# Patient Record
Sex: FEMALE | Race: BLACK OR AFRICAN AMERICAN | Employment: UNEMPLOYED | URBAN - METROPOLITAN AREA
[De-identification: names, ages, dates, MRNs, and addresses within clinical notes are randomized per-mention and may not be internally consistent; named-entity substitution may affect disease eponyms.]

---

## 2020-08-18 ENCOUNTER — HOSPITAL ENCOUNTER (EMERGENCY)
Facility: CLINIC | Age: 55
Discharge: HOME | End: 2020-08-18
Attending: EMERGENCY MEDICINE | Admitting: EMERGENCY MEDICINE
Payer: OTHER MISCELLANEOUS

## 2020-08-18 VITALS
TEMPERATURE: 98 F | DIASTOLIC BLOOD PRESSURE: 101 MMHG | SYSTOLIC BLOOD PRESSURE: 149 MMHG | OXYGEN SATURATION: 100 % | WEIGHT: 127 LBS | HEIGHT: 66 IN | RESPIRATION RATE: 12 BRPM | BODY MASS INDEX: 20.41 KG/M2 | HEART RATE: 59 BPM

## 2020-08-18 DIAGNOSIS — M54.6 PAIN IN THORACIC SPINE: ICD-10-CM

## 2020-08-18 DIAGNOSIS — M54.12 CERVICAL RADICULOPATHY: ICD-10-CM

## 2020-08-18 LAB
ANION GAP SERPL CALCULATED.3IONS-SCNC: 3 MMOL/L (ref 3–14)
BUN SERPL-MCNC: 14 MG/DL (ref 7–30)
CALCIUM SERPL-MCNC: 9.7 MG/DL (ref 8.5–10.1)
CHLORIDE SERPL-SCNC: 100 MMOL/L (ref 94–109)
CO2 SERPL-SCNC: 30 MMOL/L (ref 20–32)
CREAT SERPL-MCNC: 0.88 MG/DL (ref 0.52–1.04)
GFR SERPL CREATININE-BSD FRML MDRD: 74 ML/MIN/{1.73_M2}
GLUCOSE SERPL-MCNC: 118 MG/DL (ref 70–99)
POTASSIUM SERPL-SCNC: 3.6 MMOL/L (ref 3.4–5.3)
SODIUM SERPL-SCNC: 132 MMOL/L (ref 133–144)

## 2020-08-18 PROCEDURE — 99283 EMERGENCY DEPT VISIT LOW MDM: CPT | Performed by: EMERGENCY MEDICINE

## 2020-08-18 PROCEDURE — 80048 BASIC METABOLIC PNL TOTAL CA: CPT | Performed by: EMERGENCY MEDICINE

## 2020-08-18 PROCEDURE — 25000132 ZZH RX MED GY IP 250 OP 250 PS 637: Performed by: EMERGENCY MEDICINE

## 2020-08-18 PROCEDURE — 99284 EMERGENCY DEPT VISIT MOD MDM: CPT | Mod: Z6 | Performed by: EMERGENCY MEDICINE

## 2020-08-18 RX ORDER — TRAMADOL HYDROCHLORIDE 50 MG/1
50 TABLET ORAL EVERY 6 HOURS PRN
COMMUNITY
End: 2020-08-18

## 2020-08-18 RX ORDER — TRAMADOL HYDROCHLORIDE 50 MG/1
50 TABLET ORAL ONCE
Status: COMPLETED | OUTPATIENT
Start: 2020-08-18 | End: 2020-08-18

## 2020-08-18 RX ORDER — GABAPENTIN 100 MG/1
300 CAPSULE ORAL 3 TIMES DAILY
Qty: 90 CAPSULE | Refills: 0 | Status: SHIPPED | OUTPATIENT
Start: 2020-08-18 | End: 2020-09-06

## 2020-08-18 RX ORDER — TRAMADOL HYDROCHLORIDE 50 MG/1
50 TABLET ORAL EVERY 6 HOURS PRN
Qty: 15 TABLET | Refills: 0 | Status: SHIPPED | OUTPATIENT
Start: 2020-08-18 | End: 2024-05-06

## 2020-08-18 RX ORDER — PANTOPRAZOLE SODIUM 40 MG/1
40 TABLET, DELAYED RELEASE ORAL DAILY
Qty: 30 TABLET | Refills: 0 | Status: SHIPPED | OUTPATIENT
Start: 2020-08-18 | End: 2020-09-17

## 2020-08-18 RX ORDER — GABAPENTIN 100 MG/1
100 CAPSULE ORAL 3 TIMES DAILY
COMMUNITY
End: 2020-08-18

## 2020-08-18 RX ORDER — OXYCODONE AND ACETAMINOPHEN 5; 325 MG/1; MG/1
1 TABLET ORAL EVERY 6 HOURS PRN
COMMUNITY
End: 2024-05-03

## 2020-08-18 RX ADMIN — TRAMADOL HYDROCHLORIDE 50 MG: 50 TABLET, FILM COATED ORAL at 12:11

## 2020-08-18 ASSESSMENT — ENCOUNTER SYMPTOMS
VOMITING: 0
COUGH: 0
CHILLS: 0
SHORTNESS OF BREATH: 0
NAUSEA: 0
WEAKNESS: 1
FEVER: 0

## 2020-08-18 ASSESSMENT — MIFFLIN-ST. JEOR: SCORE: 1192.82
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
## 2020-08-18 NOTE — ED AVS SNAPSHOT
[facility], [place], Emergency Department  [address] [place] [id number]  Phone:  [phone]                                    Alejandrina Clement   MRN: [id number]    Department:  [facility], Emergency Department   Date of Visit:  8/18/2020           After Visit Summary Signature Page    I have received my discharge instructions, and my questions have been answered. I have discussed any challenges I see with this plan with the nurse or doctor.    ..........................................................................................................................................  Patient/Patient Representative Signature      ..........................................................................................................................................  Patient Representative Print Name and Relationship to Patient    ..................................................               ................................................  Date                                   Time    ..........................................................................................................................................  Reviewed by Signature/Title    ...................................................              ..............................................  Date                                               Time          22EPIC Rev 08/18

## 2020-08-18 NOTE — LETTER
August 18, 2020      To Whom It May Concern:      Alejandrina Clement was seen in our Emergency Department today, 08/18/20.  She is having arm pain and should not use/lift with her right arm until seen/cleared by neurosurgery.     Sincerely,        Missael Johnson MD
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
## 2020-08-23 ENCOUNTER — HOSPITAL ENCOUNTER (EMERGENCY)
Facility: CLINIC | Age: 55
Discharge: LEFT BEFORE DISCHARGE | End: 2020-08-23
Payer: COMMERCIAL

## 2020-08-23 VITALS
WEIGHT: 127 LBS | DIASTOLIC BLOOD PRESSURE: 66 MMHG | OXYGEN SATURATION: 98 % | TEMPERATURE: 98.8 F | HEART RATE: 89 BPM | BODY MASS INDEX: 20.41 KG/M2 | RESPIRATION RATE: 18 BRPM | HEIGHT: 66 IN | SYSTOLIC BLOOD PRESSURE: 98 MMHG

## 2020-08-23 ASSESSMENT — MIFFLIN-ST. JEOR: SCORE: 1192.82
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
## 2020-08-24 NOTE — ED NOTES
Dr Kiki Vallecillo reviewed EKGs dated 7-19-17 and 7-5-16. Pt ok for surgery. Pt stated she is feeling better and no longer needs to be seen by a provider. RN reviewed Declination of Medical Screening Examination form with patient. Form signed by patient and RN. Pt left ED at 2131.
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
## 2020-09-06 ENCOUNTER — HOSPITAL ENCOUNTER (EMERGENCY)
Facility: CLINIC | Age: 55
Discharge: HOME | End: 2020-09-06
Attending: EMERGENCY MEDICINE | Admitting: EMERGENCY MEDICINE
Payer: COMMERCIAL

## 2020-09-06 VITALS
OXYGEN SATURATION: 100 % | TEMPERATURE: 98.6 F | DIASTOLIC BLOOD PRESSURE: 88 MMHG | HEART RATE: 80 BPM | SYSTOLIC BLOOD PRESSURE: 105 MMHG

## 2020-09-06 DIAGNOSIS — Z76.0 ENCOUNTER FOR MEDICATION REFILL: ICD-10-CM

## 2020-09-06 PROCEDURE — 99282 EMERGENCY DEPT VISIT SF MDM: CPT | Performed by: EMERGENCY MEDICINE

## 2020-09-06 PROCEDURE — 99283 EMERGENCY DEPT VISIT LOW MDM: CPT | Mod: Z6 | Performed by: EMERGENCY MEDICINE

## 2020-09-06 RX ORDER — IBUPROFEN 200 MG
400 TABLET ORAL EVERY 6 HOURS PRN
Qty: 60 TABLET | Refills: 0 | Status: SHIPPED | OUTPATIENT
Start: 2020-09-06 | End: 2024-05-06

## 2020-09-06 RX ORDER — ACETAMINOPHEN 325 MG/1
650 TABLET ORAL EVERY 8 HOURS PRN
Qty: 30 TABLET | Refills: 0 | Status: SHIPPED | OUTPATIENT
Start: 2020-09-06 | End: 2024-05-06

## 2020-09-06 RX ORDER — DIPHENHYDRAMINE HCL 25 MG
25 TABLET ORAL EVERY 8 HOURS PRN
Qty: 12 TABLET | Refills: 0 | Status: SHIPPED | OUTPATIENT
Start: 2020-09-06 | End: 2024-05-06

## 2020-09-06 RX ORDER — GABAPENTIN 100 MG/1
300 CAPSULE ORAL 3 TIMES DAILY
Qty: 63 CAPSULE | Refills: 0 | Status: SHIPPED | OUTPATIENT
Start: 2020-09-06 | End: 2024-05-02

## 2020-09-06 ASSESSMENT — ENCOUNTER SYMPTOMS
CONFUSION: 0
ARTHRALGIAS: 1
NECK STIFFNESS: 0
FEVER: 0
EYE REDNESS: 0
BACK PAIN: 1
DIFFICULTY URINATING: 0
SHORTNESS OF BREATH: 0
HEADACHES: 0
COLOR CHANGE: 0
ABDOMINAL PAIN: 0

## 2020-09-06 NOTE — ED AVS SNAPSHOT
[facility], [place], Emergency Department  [address] [place] [id number]  Phone:  [phone]                                    Alejandrina Clement   MRN: [id number]    Department:  [facility], Emergency Department   Date of Visit:  9/6/2020           After Visit Summary Signature Page    I have received my discharge instructions, and my questions have been answered. I have discussed any challenges I see with this plan with the nurse or doctor.    ..........................................................................................................................................  Patient/Patient Representative Signature      ..........................................................................................................................................  Patient Representative Print Name and Relationship to Patient    ..................................................               ................................................  Date                                   Time    ..........................................................................................................................................  Reviewed by Signature/Title    ...................................................              ..............................................  Date                                               Time          22EPIC Rev 08/18

## 2020-09-07 NOTE — DISCHARGE INSTRUCTIONS
TODAY'S VISIT:  - Please call the Primary Care team to discuss and arrange a follow-up appointment as soon as possible.   - Return to the nearest Emergency Department with any new or worsening symptoms or any concerns.    FOLLOW-UP:  Please make an appointment to follow up with: A Primary Care Provider, as soon as possible.   - If you do not have a primary care provider, you can be seen in follow-up and establish care with one of our providers by calling of the the clinics below:  --- [facility] (phone: [phone])  --- Primary Care / [facility] (phone: [phone])   --- Primary Care - [facility] (phone: [phone])   - Have your provider review the results from today's visit with you again to make sure no further follow-up or additional testing is needed based on those results.     PRESCRIPTIONS / MEDICATIONS:  - Take medications only as recommended on packaging/prescriptions.  - Establish care with a Primary Care provider for ongoing medication refills.     OTHER INSTRUCTIONS:  - Do your best to stay hydrated.    RETURN TO THE EMERGENCY DEPARTMENT  Return to the Emergency Department immediately for any new or worsening symptoms or any concerns.     Remember that you can always come back to the Emergency Department if you are not able to see your regular doctor in the amount of time listed above, if you get any new symptoms, or if there is anything that worries you.
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
## 2020-09-07 NOTE — ED TRIAGE NOTES
Patient states she has run out of all of her medications including pain medications and needs refills.

## 2020-09-08 ASSESSMENT — ENCOUNTER SYMPTOMS
WEAKNESS: 0
WOUND: 0
NUMBNESS: 0
NECK PAIN: 1
SEIZURES: 0

## 2022-11-18 DIAGNOSIS — M79.643 PAIN, HAND: Primary | ICD-10-CM

## 2022-11-21 NOTE — TELEPHONE ENCOUNTER
Action November 21, 2022 1:51 PM MT   Action Taken Called patient to confirm which hand she will be coming in to be seen for. Patient confirms RT hand. Sent a request for images from  [phone].     DIAGNOSIS: right hand arthritis   APPOINTMENT DATE: 11/28/2022   NOTES STATUS DETAILS   OFFICE NOTE from referring provider SELF    OFFICE NOTE from other specialist Care Everywhere 06/24/2021 - Farida Santos MD -  Internal Med    06/22/2021 -  Physical Therapy    08/28/2020 - Ozzy Rm MD [facility]    More in [facility]..   DISCHARGE REPORT from the ER Care Everywhere 08/05/2020 -  [facility]   MEDICATION LIST Internal  Care Everywhere    EMG (for Spine) Care Everywhere 08/13/2020 - [facility] EMG   LABS     CBC/DIFF Care Everywhere 05/16/2022   MRI PACS HP:  08/12/2020 - RT Shoulder

## 2022-11-28 ENCOUNTER — PRE VISIT (OUTPATIENT)
Dept: ORTHOPEDICS | Facility: CLINIC | Age: 57
End: 2022-11-28

## 2022-12-13 ENCOUNTER — ANCILLARY PROCEDURE (OUTPATIENT)
Dept: GENERAL RADIOLOGY | Facility: CLINIC | Age: 57
End: 2022-12-13
Attending: STUDENT IN AN ORGANIZED HEALTH CARE EDUCATION/TRAINING PROGRAM
Payer: COMMERCIAL

## 2022-12-13 ENCOUNTER — OFFICE VISIT (OUTPATIENT)
Dept: ORTHOPEDICS | Facility: CLINIC | Age: 57
End: 2022-12-13
Payer: COMMERCIAL

## 2022-12-13 VITALS
SYSTOLIC BLOOD PRESSURE: 112 MMHG | BODY MASS INDEX: 19.83 KG/M2 | HEIGHT: 66 IN | DIASTOLIC BLOOD PRESSURE: 76 MMHG | WEIGHT: 123.4 LBS

## 2022-12-13 DIAGNOSIS — M79.645 THUMB PAIN, LEFT: ICD-10-CM

## 2022-12-13 DIAGNOSIS — G56.01 RIGHT CARPAL TUNNEL SYNDROME: ICD-10-CM

## 2022-12-13 DIAGNOSIS — M65.312 TRIGGER THUMB OF LEFT HAND: ICD-10-CM

## 2022-12-13 DIAGNOSIS — M79.645 THUMB PAIN, LEFT: Primary | ICD-10-CM

## 2022-12-13 DIAGNOSIS — G56.03 BILATERAL CARPAL TUNNEL SYNDROME: ICD-10-CM

## 2022-12-13 PROCEDURE — 73130 X-RAY EXAM OF HAND: CPT | Mod: TC | Performed by: RADIOLOGY

## 2022-12-13 PROCEDURE — 99204 OFFICE O/P NEW MOD 45 MIN: CPT | Mod: 25 | Performed by: STUDENT IN AN ORGANIZED HEALTH CARE EDUCATION/TRAINING PROGRAM

## 2022-12-13 PROCEDURE — 20550 NJX 1 TENDON SHEATH/LIGAMENT: CPT | Mod: FA | Performed by: STUDENT IN AN ORGANIZED HEALTH CARE EDUCATION/TRAINING PROGRAM

## 2022-12-13 RX ORDER — LIDOCAINE HYDROCHLORIDE 10 MG/ML
1 INJECTION, SOLUTION INFILTRATION; PERINEURAL
Status: DISCONTINUED | OUTPATIENT
Start: 2022-12-13 | End: 2024-05-03

## 2022-12-13 RX ORDER — TESTOSTERONE CYPIONATE 200 MG/ML
1 INJECTION INTRAMUSCULAR
Status: DISCONTINUED | OUTPATIENT
Start: 2022-12-13 | End: 2024-05-03

## 2022-12-13 RX ADMIN — LIDOCAINE HYDROCHLORIDE 1 ML: 10 INJECTION, SOLUTION INFILTRATION; PERINEURAL at 16:45

## 2022-12-13 RX ADMIN — TESTOSTERONE CYPIONATE 1 ML: 200 INJECTION INTRAMUSCULAR at 16:45

## 2022-12-13 ASSESSMENT — PAIN SCALES - GENERAL: PAINLEVEL: SEVERE PAIN (7)

## 2022-12-13 NOTE — PROGRESS NOTES
Hand / Upper Extremity Injection/Arthrocentesis: L thumb A1    Date/Time: 12/13/2022 4:45 PM  Performed by: Charles Weathers MD  Authorized by: Charles Weathers MD     Needle Size:  25 G  Guidance: landmark    Condition: trigger finger    Location:  Thumb    Site:  L thumb A1  Medications:  1 mL dexamethasone 120 MG/30ML; 1 mL lidocaine 1 %  Outcome:  Tolerated well, no immediate complications  Consent Given by:  Patient  Timeout: timeout called immediately prior to procedure    Prep: patient was prepped and draped in usual sterile fashion

## 2022-12-13 NOTE — LETTER
December 13, 2022      Alejandrina Clement  [address] [place] [address]        To Whom It May Concern:    Alejandrina Clement was seen in our clinic. She was unable to work from 11/14/22- 12/13/22 due to medical reasons. 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       Sincerely,        Charles Weathers MD

## 2022-12-13 NOTE — LETTER
December 13, 2022      Alejandrina Clement  [address] [place] [address]        To Whom It May Concern:    Alejandrina Clement was seen in our clinic. She was unable to work from 11/14/22- 12/14/22 due to medical reasons. 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       Sincerely,        Charles Weathers MD

## 2022-12-13 NOTE — PATIENT INSTRUCTIONS
Thank you for choosing [facility]    Dr. Weathers Locations:    [facility] [address], [address]  [address] [address]   Appointments: [phone] Appointments: [phone]   Fax: [phone] Fax: [phone]     Follow up: after completion of EMG    An EMG referral was placed today at [facility]. You can call [phone] to schedule at your convenience.     Please call [phone] to schedule your follow up appointment.

## 2022-12-13 NOTE — PROGRESS NOTES
Orthopaedic Surgery Hand and Upper Extremity Clinic H&P NOTE:  Date: Dec 13, 2022    Patient Name: Alejandrina Clement  MRN: [id number]    CHIEF COMPLAINT: left thumb pain, right hand numbness    Dominant Hand:  Right  Occupation: opener at [employer]     HPI:  Ms. Alejandrina Clement is a 57 year old female right hand dominant who presents with left thumb pain and right hand numbness and  weakness. The right hand numbness and tingling has been present a few years, but worsening recently. Patient has a history of cervical radiculopathy and was previously evaluated by [facility] neurosurgery in 2021. Notes in care everywhere were reviewed. At the time she had an EMG/NCS which neurosurgery notes say demonstrated mild right carpal tunnel syndrome, EMG is not available for review. States all fingers go numb and painful at night, wakes her up. Has a brace but has not been wearing consistently recently. She also has complaints of left thumb pain, ongoing 2 weeks with no known injury. She has difficulty bending left thumb in mornings. Very tender at A1 pulley.      PAST MEDICAL HISTORY:  Past Medical History:   Diagnosis Date     Benign tumor of brain (H)      Hypertension        PAST SURGICAL HISTORY:  No past surgical history on file.    MEDICATIONS:  Current Outpatient Medications   Medication     acetaminophen (TYLENOL) 325 MG tablet     oxyCODONE-acetaminophen (PERCOCET) 5-325 MG tablet     diphenhydrAMINE (BENADRYL) 25 MG tablet     gabapentin (NEURONTIN) 100 MG capsule     ibuprofen (ADVIL/MOTRIN) 200 MG tablet     oxyCODONE (ROXICODONE) 5 MG immediate release tablet     traMADol (ULTRAM) 50 MG tablet     No current facility-administered medications for this visit.       ALLERGIES:     Allergies   Allergen Reactions     Vicodin [Hydrocodone-Acetaminophen]        FAMILY HISTORY:  No pertinent family history    SOCIAL HISTORY:  Social History     Tobacco Use     Smoking status: Every Day     Packs/day: 0.50     Types:  "Cigarettes     Smokeless tobacco: Never   Substance Use Topics     Alcohol use: Yes     Alcohol/week: 5.0 standard drinks     Types: 5 Shots of liquor per week     Drug use: Yes     Frequency: 3.0 times per week     Types: Marijuana     The patient's past medical, family, and social history was reviewed and confirmed.    REVIEW OF SYMPTOMS:      General: Negative   Eyes: Negative   Ear, Nose and Throat: Negative   Respiratory: Negative   Cardiovascular: Negative   Gastrointestinal: Negative   Genito-urinary: Negative   Musculoskeletal: Negative  Neurological: Negative   Psychological: Negative  HEME: Negative   ENDO: Negative   SKIN: Negative    VITALS:  Vitals:    12/13/22 1541   Weight: 56 kg (123 lb 6.4 oz)   Height: 1.676 m (5' 6\")       EXAM:  General: NAD, A&Ox3  HEENT: NC/AT  CV: RRR by peripheral pulse  Pulmonary: Non-labored breathing on RA  RUE:  Skin intact, mild thenar atrophy  Mild CMC tenderness  Negative Tinel's at carpal tunnel, negative Durkan's compression test at carpal tunnel  Negative Tinel's at cubital tunnel  \"scratchy/hypersensitive\" at index finger tip (median), intact ulnar and radial  WWP CR< 2s    LUE:  Skin intact, mild thenar atrophy  +TTP and swelling and palpable nodule at A1 pulley of left thumb  Diminished flexion ability  Mild CMC tenderness  Negative Tinel's at carpal tunnel, negative Durkan's compression test at carpal tunnel  Negative Tinel's at cubital tunnel  Intact EPl/FPL/HI  SILT m/r/u  WWP CR< 2s        IMAGING:    Bilateral hand XR today demonstrates Mild thumb CMC subluxation without degneerative change on the right. Otherwise no abnormalities    MRI c-spine 5/2020 (care everywhere)  IMPRESSION:   1.  Advanced spondylosis with severe disc degeneration C4-C7. Advanced facet arthropathy C7-T1 with mild anterior spondylolisthesis and asymmetric right-sided facet edema at C7-T1 and T1-T2.   2.  Multilevel severe foraminal stenoses right C4-C5, bilateral C5-C6, bilateral " C6-C7, and bilateral C7-T1. Moderate to severe left foraminal stenosis at C4-C5.   3.  Mild to moderate spinal canal stenosis at C5-C6 and mild spinal canal stenosis at C6-C7.      I have personally reviewed the above images and labs.         IMPRESSION AND RECOMMENDATIONS:  Ms. Alejandrina Clement is a 57 year old female right hand dominant with right hand weakness, numbness/pain most consistent with right carpal tunnel syndrome. Also with left trigger thumb.    Recommend repeat EMG/NCS bilaterally to evaluate for carpal tunnel syndrome, cubital tunnel syndrome, cervical radiculopathy    For left trigger thumb, recommended a steroid injection, patient is agreeable.    After obtaining written consent, I cleansed the skin over the A1 pulley of the left thumb with chlorhexidine.  I then anesthetized skin with ethyl chloride free spray after which I injected 1 cc 1% lidocaine and 1 cc of dexamethasone 4 mg/mL into the A1 pulley region and flexor tendon sheath of the FPL.  The patient tolerated this well with an immediate improvement in her symptoms.    Patient was provided with a right carpal tunnel splint to be worn at night. Instructed patient to wear this every night.    Follow-up after EMG/NCS    Charles Weathers MD    Hand, Upper Extremity & Microvascular Surgery  Department of Orthopaedic Surgery  [facility]

## 2022-12-13 NOTE — LETTER
12/13/2022         RE: Alejandrina Clement  [address]  [place] [address]        Dear Colleague,    Thank you for referring your patient, Alejandrina Clement, to the [facility] [place]. Please see a copy of my visit note below.    Orthopaedic Surgery Hand and Upper Extremity Clinic H&P NOTE:  Date: Dec 13, 2022    Patient Name: Alejandrina Clement  MRN: [id number]    CHIEF COMPLAINT: left thumb pain, right hand numbness    Dominant Hand:  Right  Occupation: opener at [employer]     HPI:  Ms. Alejandrina Clement is a 57 year old female right hand dominant who presents with left thumb pain and right hand numbness and  weakness. The right hand numbness and tingling has been present a few years, but worsening recently. Patient has a history of cervical radiculopathy and was previously evaluated by [facility] neurosurgery in 2021. Notes in care everywhere were reviewed. At the time she had an EMG/NCS which neurosurgery notes say demonstrated mild right carpal tunnel syndrome, EMG is not available for review. States all fingers go numb and painful at night, wakes her up. Has a brace but has not been wearing consistently recently. She also has complaints of left thumb pain, ongoing 2 weeks with no known injury. She has difficulty bending left thumb in mornings. Very tender at A1 pulley.      PAST MEDICAL HISTORY:  Past Medical History:   Diagnosis Date     Benign tumor of brain (H)      Hypertension        PAST SURGICAL HISTORY:  No past surgical history on file.    MEDICATIONS:  Current Outpatient Medications   Medication     acetaminophen (TYLENOL) 325 MG tablet     oxyCODONE-acetaminophen (PERCOCET) 5-325 MG tablet     diphenhydrAMINE (BENADRYL) 25 MG tablet     gabapentin (NEURONTIN) 100 MG capsule     ibuprofen (ADVIL/MOTRIN) 200 MG tablet     oxyCODONE (ROXICODONE) 5 MG immediate release tablet     traMADol (ULTRAM) 50 MG tablet     No current facility-administered medications for this  "visit.       ALLERGIES:     Allergies   Allergen Reactions     Vicodin [Hydrocodone-Acetaminophen]        FAMILY HISTORY:  No pertinent family history    SOCIAL HISTORY:  Social History     Tobacco Use     Smoking status: Every Day     Packs/day: 0.50     Types: Cigarettes     Smokeless tobacco: Never   Substance Use Topics     Alcohol use: Yes     Alcohol/week: 5.0 standard drinks     Types: 5 Shots of liquor per week     Drug use: Yes     Frequency: 3.0 times per week     Types: Marijuana     The patient's past medical, family, and social history was reviewed and confirmed.    REVIEW OF SYMPTOMS:      General: Negative   Eyes: Negative   Ear, Nose and Throat: Negative   Respiratory: Negative   Cardiovascular: Negative   Gastrointestinal: Negative   Genito-urinary: Negative   Musculoskeletal: Negative  Neurological: Negative   Psychological: Negative  HEME: Negative   ENDO: Negative   SKIN: Negative    VITALS:  Vitals:    12/13/22 1541   Weight: 56 kg (123 lb 6.4 oz)   Height: 1.676 m (5' 6\")       EXAM:  General: NAD, A&Ox3  HEENT: NC/AT  CV: RRR by peripheral pulse  Pulmonary: Non-labored breathing on RA  RUE:  Skin intact, mild thenar atrophy  Mild CMC tenderness  Negative Tinel's at carpal tunnel, negative Durkan's compression test at carpal tunnel  Negative Tinel's at cubital tunnel  \"scratchy/hypersensitive\" at index finger tip (median), intact ulnar and radial  WWP CR< 2s    LUE:  Skin intact, mild thenar atrophy  +TTP and swelling and palpable nodule at A1 pulley of left thumb  Diminished flexion ability  Mild CMC tenderness  Negative Tinel's at carpal tunnel, negative Durkan's compression test at carpal tunnel  Negative Tinel's at cubital tunnel  Intact EPl/FPL/HI  SILT m/r/u  WWP CR< 2s        IMAGING:    Bilateral hand XR today demonstrates Mild thumb CMC subluxation without degneerative change on the right. Otherwise no abnormalities    MRI c-spine 5/2020 (care everywhere)  IMPRESSION:   1.  Advanced " spondylosis with severe disc degeneration C4-C7. Advanced facet arthropathy C7-T1 with mild anterior spondylolisthesis and asymmetric right-sided facet edema at C7-T1 and T1-T2.   2.  Multilevel severe foraminal stenoses right C4-C5, bilateral C5-C6, bilateral C6-C7, and bilateral C7-T1. Moderate to severe left foraminal stenosis at C4-C5.   3.  Mild to moderate spinal canal stenosis at C5-C6 and mild spinal canal stenosis at C6-C7.      I have personally reviewed the above images and labs.         IMPRESSION AND RECOMMENDATIONS:  Ms. Alejandrina Clement is a 57 year old female right hand dominant with right hand weakness, numbness/pain most consistent with right carpal tunnel syndrome. Also with left trigger thumb.    Recommend repeat EMG/NCS bilaterally to evaluate for carpal tunnel syndrome, cubital tunnel syndrome, cervical radiculopathy    For left trigger thumb, recommended a steroid injection, patient is agreeable.    After obtaining written consent, I cleansed the skin over the A1 pulley of the left thumb with chlorhexidine.  I then anesthetized skin with ethyl chloride free spray after which I injected 1 cc 1% lidocaine and 1 cc of dexamethasone 4 mg/mL into the A1 pulley region and flexor tendon sheath of the FPL.  The patient tolerated this well with an immediate improvement in her symptoms.    Patient was provided with a right carpal tunnel splint to be worn at night. Instructed patient to wear this every night.    Follow-up after EMG/NCS    Charles Weathers MD    Hand, Upper Extremity & Microvascular Surgery  Department of Orthopaedic Surgery  [facility]      Hand / Upper Extremity Injection/Arthrocentesis: L thumb A1    Date/Time: 12/13/2022 4:45 PM  Performed by: Charles Weathers MD  Authorized by: Charles Weathers MD     Needle Size:  25 G  Guidance: landmark    Condition: trigger finger    Location:  Thumb    Site:  L thumb A1  Medications:  1 mL dexamethasone 120 MG/30ML; 1 mL  lidocaine 1 %  Outcome:  Tolerated well, no immediate complications  Consent Given by:  Patient  Timeout: timeout called immediately prior to procedure    Prep: patient was prepped and draped in usual sterile fashion              Again, thank you for allowing me to participate in the care of your patient.        Sincerely,        Charles Weathers MD

## 2024-05-02 ENCOUNTER — APPOINTMENT (OUTPATIENT)
Dept: CT IMAGING | Facility: CLINIC | Age: 59
End: 2024-05-02
Attending: EMERGENCY MEDICINE
Payer: COMMERCIAL

## 2024-05-02 ENCOUNTER — HOSPITAL ENCOUNTER (OUTPATIENT)
Facility: CLINIC | Age: 59
Setting detail: OBSERVATION
Discharge: NURSING, REHAB OR LONG-TERM CARE | End: 2024-05-03
Attending: EMERGENCY MEDICINE | Admitting: EMERGENCY MEDICINE
Payer: COMMERCIAL

## 2024-05-02 DIAGNOSIS — R51.9 NONINTRACTABLE HEADACHE, UNSPECIFIED CHRONICITY PATTERN, UNSPECIFIED HEADACHE TYPE: Primary | ICD-10-CM

## 2024-05-02 DIAGNOSIS — D49.7 PITUITARY TUMOR: ICD-10-CM

## 2024-05-02 DIAGNOSIS — F14.90 COCAINE USE: ICD-10-CM

## 2024-05-02 DIAGNOSIS — F10.90 ALCOHOL USE DISORDER: ICD-10-CM

## 2024-05-02 PROBLEM — F43.23 ADJUSTMENT DISORDER WITH MIXED ANXIETY AND DEPRESSED MOOD: Status: ACTIVE | Noted: 2024-05-02

## 2024-05-02 PROBLEM — F10.10 NONDEPENDENT ALCOHOL ABUSE, EPISODIC DRINKING BEHAVIOR: Status: ACTIVE | Noted: 2024-05-02

## 2024-05-02 PROBLEM — F14.10 NONDEPENDENT COCAINE ABUSE, EPISODIC (H): Status: ACTIVE | Noted: 2024-05-02

## 2024-05-02 LAB
ALBUMIN SERPL BCG-MCNC: 4.3 G/DL (ref 3.5–5.2)
ALP SERPL-CCNC: 68 U/L (ref 40–150)
ALT SERPL W P-5'-P-CCNC: 38 U/L (ref 0–50)
AMPHETAMINES UR QL SCN: ABNORMAL
ANION GAP SERPL CALCULATED.3IONS-SCNC: 11 MMOL/L (ref 7–15)
AST SERPL W P-5'-P-CCNC: 56 U/L (ref 0–45)
BARBITURATES UR QL SCN: ABNORMAL
BASOPHILS # BLD AUTO: 0 10E3/UL (ref 0–0.2)
BASOPHILS NFR BLD AUTO: 1 %
BENZODIAZ UR QL SCN: ABNORMAL
BILIRUB SERPL-MCNC: 0.6 MG/DL
BUN SERPL-MCNC: 13.3 MG/DL (ref 6–20)
BZE UR QL SCN: ABNORMAL
CALCIUM SERPL-MCNC: 9.2 MG/DL (ref 8.6–10)
CANNABINOIDS UR QL SCN: ABNORMAL
CHLORIDE SERPL-SCNC: 102 MMOL/L (ref 98–107)
CREAT SERPL-MCNC: 0.94 MG/DL (ref 0.51–0.95)
DEPRECATED HCO3 PLAS-SCNC: 22 MMOL/L (ref 22–29)
EGFRCR SERPLBLD CKD-EPI 2021: 70 ML/MIN/1.73M2
EOSINOPHIL # BLD AUTO: 0.1 10E3/UL (ref 0–0.7)
EOSINOPHIL NFR BLD AUTO: 1 %
ERYTHROCYTE [DISTWIDTH] IN BLOOD BY AUTOMATED COUNT: 14.6 % (ref 10–15)
FENTANYL UR QL: ABNORMAL
GLUCOSE SERPL-MCNC: 75 MG/DL (ref 70–99)
HCT VFR BLD AUTO: 38.2 % (ref 35–47)
HGB BLD-MCNC: 12.6 G/DL (ref 11.7–15.7)
HOLD SPECIMEN: NORMAL
IMM GRANULOCYTES # BLD: 0 10E3/UL
IMM GRANULOCYTES NFR BLD: 0 %
LYMPHOCYTES # BLD AUTO: 4.9 10E3/UL (ref 0.8–5.3)
LYMPHOCYTES NFR BLD AUTO: 76 %
MAGNESIUM SERPL-MCNC: 2.1 MG/DL (ref 1.7–2.3)
MCH RBC QN AUTO: 28.6 PG (ref 26.5–33)
MCHC RBC AUTO-ENTMCNC: 33 G/DL (ref 31.5–36.5)
MCV RBC AUTO: 87 FL (ref 78–100)
MONOCYTES # BLD AUTO: 0.5 10E3/UL (ref 0–1.3)
MONOCYTES NFR BLD AUTO: 7 %
NEUTROPHILS # BLD AUTO: 1 10E3/UL (ref 1.6–8.3)
NEUTROPHILS NFR BLD AUTO: 15 %
NRBC # BLD AUTO: 0 10E3/UL
NRBC BLD AUTO-RTO: 0 /100
OPIATES UR QL SCN: ABNORMAL
PCP QUAL URINE (ROCHE): ABNORMAL
PLATELET # BLD AUTO: 256 10E3/UL (ref 150–450)
POTASSIUM SERPL-SCNC: 4.4 MMOL/L (ref 3.4–5.3)
PROT SERPL-MCNC: 7.8 G/DL (ref 6.4–8.3)
RBC # BLD AUTO: 4.4 10E6/UL (ref 3.8–5.2)
SODIUM SERPL-SCNC: 135 MMOL/L (ref 135–145)
TSH SERPL DL<=0.005 MIU/L-ACNC: 1.61 UIU/ML (ref 0.3–4.2)
WBC # BLD AUTO: 6.5 10E3/UL (ref 4–11)

## 2024-05-02 PROCEDURE — 84443 ASSAY THYROID STIM HORMONE: CPT | Performed by: EMERGENCY MEDICINE

## 2024-05-02 PROCEDURE — 36415 COLL VENOUS BLD VENIPUNCTURE: CPT | Performed by: EMERGENCY MEDICINE

## 2024-05-02 PROCEDURE — 70450 CT HEAD/BRAIN W/O DYE: CPT | Mod: 26 | Performed by: RADIOLOGY

## 2024-05-02 PROCEDURE — 70450 CT HEAD/BRAIN W/O DYE: CPT

## 2024-05-02 PROCEDURE — 96360 HYDRATION IV INFUSION INIT: CPT | Mod: 59

## 2024-05-02 PROCEDURE — 258N000003 HC RX IP 258 OP 636: Performed by: EMERGENCY MEDICINE

## 2024-05-02 PROCEDURE — 99285 EMERGENCY DEPT VISIT HI MDM: CPT | Mod: 25 | Performed by: EMERGENCY MEDICINE

## 2024-05-02 PROCEDURE — 250N000013 HC RX MED GY IP 250 OP 250 PS 637: Performed by: EMERGENCY MEDICINE

## 2024-05-02 PROCEDURE — 99285 EMERGENCY DEPT VISIT HI MDM: CPT | Performed by: EMERGENCY MEDICINE

## 2024-05-02 PROCEDURE — 84075 ASSAY ALKALINE PHOSPHATASE: CPT | Performed by: EMERGENCY MEDICINE

## 2024-05-02 PROCEDURE — 83735 ASSAY OF MAGNESIUM: CPT | Performed by: EMERGENCY MEDICINE

## 2024-05-02 PROCEDURE — G0378 HOSPITAL OBSERVATION PER HR: HCPCS

## 2024-05-02 PROCEDURE — 80307 DRUG TEST PRSMV CHEM ANLYZR: CPT | Performed by: EMERGENCY MEDICINE

## 2024-05-02 PROCEDURE — 85025 COMPLETE CBC W/AUTO DIFF WBC: CPT | Performed by: EMERGENCY MEDICINE

## 2024-05-02 RX ORDER — NICOTINE 21 MG/24HR
1 PATCH, TRANSDERMAL 24 HOURS TRANSDERMAL DAILY
Status: DISCONTINUED | OUTPATIENT
Start: 2024-05-02 | End: 2024-05-03 | Stop reason: HOSPADM

## 2024-05-02 RX ADMIN — SODIUM CHLORIDE, POTASSIUM CHLORIDE, SODIUM LACTATE AND CALCIUM CHLORIDE 1000 ML: 600; 310; 30; 20 INJECTION, SOLUTION INTRAVENOUS at 10:46

## 2024-05-02 RX ADMIN — NICOTINE 1 PATCH: 14 PATCH, EXTENDED RELEASE TRANSDERMAL at 16:00

## 2024-05-02 ASSESSMENT — ACTIVITIES OF DAILY LIVING (ADL)
ADLS_ACUITY_SCORE: 35

## 2024-05-02 ASSESSMENT — COLUMBIA-SUICIDE SEVERITY RATING SCALE - C-SSRS
2. HAVE YOU ACTUALLY HAD ANY THOUGHTS OF KILLING YOURSELF IN THE PAST MONTH?: YES
5. HAVE YOU STARTED TO WORK OUT OR WORKED OUT THE DETAILS OF HOW TO KILL YOURSELF? DO YOU INTEND TO CARRY OUT THIS PLAN?: NO
6. HAVE YOU EVER DONE ANYTHING, STARTED TO DO ANYTHING, OR PREPARED TO DO ANYTHING TO END YOUR LIFE?: NO
1. IN THE PAST MONTH, HAVE YOU WISHED YOU WERE DEAD OR WISHED YOU COULD GO TO SLEEP AND NOT WAKE UP?: YES
4. HAVE YOU HAD THESE THOUGHTS AND HAD SOME INTENTION OF ACTING ON THEM?: NO
3. HAVE YOU BEEN THINKING ABOUT HOW YOU MIGHT KILL YOURSELF?: NO

## 2024-05-02 NOTE — ED PROVIDER NOTES
"    [facility] EMERGENCY DEPARTMENT ([facility])    5/02/24       ED PROVIDER NOTE    History     Chief Complaint   Patient presents with    Drug / Alcohol Assessment     HPI  Alejandrina Clement is a 59 year old female who reports having a distant history of pituitary tumor and a distant history of alcohol and drug abuse has been sober for 8 years. Reports relapsing using consistent alcohol and nasal and smoking cocaine over the past 3 days. Patient was directed here for medical evaluation and help upon full assessment. Patient reports feeling suicidal while intoxicated last night. She did not have a plan or drink attempt, any actions currently while clinically sober. Patient denies suicidality or any other acute mental health symptoms. Full history also reveals that patient felt like she has had intermittent visual symptoms which are unclear the specifics and intermittent headaches though does not have a headache or visual symptoms right now.    Physical Exam   BP: 111/80  Pulse: 67  Temp: 97.4  F (36.3  C)  Resp: 18  Height: 167.6 cm (5' 6\")  Weight: 58.5 kg (129 lb)  SpO2: 92 %  Physical Exam  Patient overall well-appearing, nontoxic, dry skin.  Pupils equal reactive to light.  Lungs clear.  Heart regular rhythm rate and rhythm.  Skin dry.  Denies SI/HI, hallucinations or delusions.    ED Course, Procedures, & Data      Procedures              Results for orders placed or performed during the hospital encounter of 05/02/24   CT Head w/o Contrast     Status: None    Narrative    CT HEAD W/O CONTRAST 5/2/2024 10:38 AM    History: pit tumor history, intermittent visual symptoms/headache?     Comparison: none available     Technique: Using multidetector thin collimation helical acquisition  technique, axial, coronal and sagittal CT images from the skull base  to the vertex were obtained without intravenous contrast.   (topogram) image(s) also obtained and reviewed.    Findings:     There is no intracranial " hemorrhage, mass effect, or midline shift.  Gray/white matter differentiation in both cerebral hemispheres is  preserved. Ventricles are proportionate to the cerebral sulci. The  basal cisterns are clear.    The bony calvaria and the bones of the skull base are normal. The  visualized portions of the paranasal sinuses and mastoid air cells are  unremarkable. The pituitary gland does not appear abnormally enlarged.      Impression    Impression:  No acute intracranial pathology. Consider MRI as  clinically indicated.    I have personally reviewed the examination and initial interpretation  and I agree with the findings.    ANGELICA GRIFFITHS MD         SYSTEM ID:  [id number]   Comprehensive metabolic panel     Status: Abnormal   Result Value Ref Range    Sodium 135 135 - 145 mmol/L    Potassium 4.4 3.4 - 5.3 mmol/L    Carbon Dioxide (CO2) 22 22 - 29 mmol/L    Anion Gap 11 7 - 15 mmol/L    Urea Nitrogen 13.3 6.0 - 20.0 mg/dL    Creatinine 0.94 0.51 - 0.95 mg/dL    GFR Estimate 70 >60 mL/min/1.73m2    Calcium 9.2 8.6 - 10.0 mg/dL    Chloride 102 98 - 107 mmol/L    Glucose 75 70 - 99 mg/dL    Alkaline Phosphatase 68 40 - 150 U/L    AST 56 (H) 0 - 45 U/L    ALT 38 0 - 50 U/L    Protein Total 7.8 6.4 - 8.3 g/dL    Albumin 4.3 3.5 - 5.2 g/dL    Bilirubin Total 0.6 <=1.2 mg/dL   Magnesium     Status: Normal   Result Value Ref Range    Magnesium 2.1 1.7 - 2.3 mg/dL   TSH with free T4 reflex     Status: Normal   Result Value Ref Range    TSH 1.61 0.30 - 4.20 uIU/mL   CBC with platelets and differential     Status: Abnormal   Result Value Ref Range    WBC Count 6.5 4.0 - 11.0 10e3/uL    RBC Count 4.40 3.80 - 5.20 10e6/uL    Hemoglobin 12.6 11.7 - 15.7 g/dL    Hematocrit 38.2 35.0 - 47.0 %    MCV 87 78 - 100 fL    MCH 28.6 26.5 - 33.0 pg    MCHC 33.0 31.5 - 36.5 g/dL    RDW 14.6 10.0 - 15.0 %    Platelet Count 256 150 - 450 10e3/uL    % Neutrophils 15 %    % Lymphocytes 76 %    % Monocytes 7 %    % Eosinophils 1 %    % Basophils 1 %     % Immature Granulocytes 0 %    NRBCs per 100 WBC 0 <1 /100    Absolute Neutrophils 1.0 (L) 1.6 - 8.3 10e3/uL    Absolute Lymphocytes 4.9 0.8 - 5.3 10e3/uL    Absolute Monocytes 0.5 0.0 - 1.3 10e3/uL    Absolute Eosinophils 0.1 0.0 - 0.7 10e3/uL    Absolute Basophils 0.0 0.0 - 0.2 10e3/uL    Absolute Immature Granulocytes 0.0 <=0.4 10e3/uL    Absolute NRBCs 0.0 10e3/uL   Extra Tube     Status: None    Narrative    The following orders were created for panel order Extra Tube.  Procedure                               Abnormality         Status                     ---------                               -----------         ------                     Extra Red Top Tube[[id number]]                               Final result                 Please view results for these tests on the individual orders.   Extra Red Top Tube     Status: None   Result Value Ref Range    Hold Specimen [facility]    Urine Drug Screen Panel     Status: Abnormal   Result Value Ref Range    Amphetamines Urine Screen Negative Screen Negative    Barbituates Urine Screen Negative Screen Negative    Benzodiazepine Urine Screen Negative Screen Negative    Cannabinoids Urine Screen Negative Screen Negative    Cocaine Urine Screen Positive (A) Screen Negative    Fentanyl Qual Urine Screen Negative Screen Negative    Opiates Urine Screen Negative Screen Negative    PCP Urine Screen Negative Screen Negative   CBC with platelets differential     Status: Abnormal    Narrative    The following orders were created for panel order CBC with platelets differential.  Procedure                               Abnormality         Status                     ---------                               -----------         ------                     CBC with platelets and d...[[id number]]  Abnormal            Final result                 Please view results for these tests on the individual orders.   Urine Drug Screen     Status: Abnormal    Narrative    The following orders were  created for panel order Urine Drug Screen.  Procedure                               Abnormality         Status                     ---------                               -----------         ------                     Urine Drug Screen Panel[[id number]]      Abnormal            Final result                 Please view results for these tests on the individual orders.     Medications   nicotine (NICODERM CQ) 14 MG/24HR 24 hr patch 1 patch (has no administration in time range)   lactated ringers BOLUS 1,000 mL (0 mLs Intravenous Stopped 5/2/24 1154)     Labs Ordered and Resulted from Time of ED Arrival to Time of ED Departure   COMPREHENSIVE METABOLIC PANEL - Abnormal       Result Value    Sodium 135      Potassium 4.4      Carbon Dioxide (CO2) 22      Anion Gap 11      Urea Nitrogen 13.3      Creatinine 0.94      GFR Estimate 70      Calcium 9.2      Chloride 102      Glucose 75      Alkaline Phosphatase 68      AST 56 (*)     ALT 38      Protein Total 7.8      Albumin 4.3      Bilirubin Total 0.6     CBC WITH PLATELETS AND DIFFERENTIAL - Abnormal    WBC Count 6.5      RBC Count 4.40      Hemoglobin 12.6      Hematocrit 38.2      MCV 87      MCH 28.6      MCHC 33.0      RDW 14.6      Platelet Count 256      % Neutrophils 15      % Lymphocytes 76      % Monocytes 7      % Eosinophils 1      % Basophils 1      % Immature Granulocytes 0      NRBCs per 100 WBC 0      Absolute Neutrophils 1.0 (*)     Absolute Lymphocytes 4.9      Absolute Monocytes 0.5      Absolute Eosinophils 0.1      Absolute Basophils 0.0      Absolute Immature Granulocytes 0.0      Absolute NRBCs 0.0     URINE DRUG SCREEN PANEL - Abnormal    Amphetamines Urine Screen Negative      Barbituates Urine Screen Negative      Benzodiazepine Urine Screen Negative      Cannabinoids Urine Screen Negative      Cocaine Urine Screen Positive (*)     Fentanyl Qual Urine Screen Negative      Opiates Urine Screen Negative      PCP Urine Screen Negative     MAGNESIUM  - Normal    Magnesium 2.1     TSH WITH FREE T4 REFLEX - Normal    TSH 1.61       CT Head w/o Contrast   Final Result   Impression:  No acute intracranial pathology. Consider MRI as   clinically indicated.      I have personally reviewed the examination and initial interpretation   and I agree with the findings.      ANGELICA GRIFFITHS MD            SYSTEM ID:  [id number]             Critical care was not performed.     Medical Decision Making  The patient's presentation was of high complexity (a chronic illness severe exacerbation, progression, or side effect of treatment).    The patient's evaluation involved:  ordering and/or review of 3+ test(s) in this encounter (see separate area of note for details)  independent interpretation of testing performed by another health professional (CT head)  discussion of management or test interpretation with another health professional (DEC  for mental health)    The patient's management necessitated high risk (a decision regarding hospitalization).    Assessment & Plan    Given patient's reported pituitary tumor history and intermittent vague symptoms of headache and vision changes, will rule out large intracranial mass or intracranial bleed. This also may represent a Laxson waxing waning headache syndrome. CT head to evaluate second problem. relapse of drug and alcohol use x 3 days with suicidality last night. DEC assessment for drug and alcohol use as well as passive suicidality yesterday. Labs urine drug screen. Patient does not qualify for inpatient medical detox given shorter course of drug and alcohol use but will attempt assessment to perhaps get patient into lodging plus or other programs.    11:30 AM CT head negative -I independently reviewed the CT head showing no signs of large intracranial mass or bleed    pending DEC assessment and laboratory testing. Will provide disposition.    12 PM: I had discussion about hospitalization for mental health and chemical  dependency disorders with the DEC .  Ultimately we decided that patient does not have any acute needs for inpatient mental health admission but rather a crisis bed or a [facility] assessment for lodging plus or other residential alcohol and drug use disorder treatment.      2pm:   I updated the patient and provided nicotine patch for patient's tobacco use  Pending [facility] availability for potential disposition to residential treatment facility      I have reviewed the nursing notes. I have reviewed the findings, diagnosis, plan and need for follow up with the patient.    New Prescriptions    No medications on file       Final diagnoses:   Alcohol use disorder   Cocaine use   Pituitary tumor       Thomas Thayer MD  [facility] EMERGENCY DEPARTMENT  5/2/2024        Thomas Thayer MD  05/02/24 0712

## 2024-05-02 NOTE — CONSULTS
"Diagnostic Evaluation Consultation  Crisis Assessment    Patient Name: Alejandrina Clement  Age:  59 year old  Legal Sex: female  Gender Identity: female  Pronouns:   Race:    Black or   Black or   Ethnicity: Choose not to answer  Language: English      Patient was assessed: Virtual: [employer] Crisis Assessment Start Time: 1114 Crisis Assessment Stop Time: 1144  Patient location: [facility] EMERGENCY DEPARTMENT                             ED20    Referral Data and Chief Complaint  Alejandrina Clement presents to the ED via EMS. Patient is presenting to the ED for the following concerns: Substance use, Depression, Anxiety, Worsening psychosocial stress.   Factors that make the mental health crisis life threatening or complex are:  Pt arrived at the ED via EMS after relapsing on alcohol and cocaine after eight years of sobriety. Pt has binged on alcohol and cocaine over the past three days and her last use of both was yesterday evening. Pt recently lost her home and had to move her mother into a nursing home. Pt is homeless and has been staying in a motel and reports that it is \"drug infested and filled with addicts.\" Pt has no formal mental health diagnoses and/or treatment history and has never been prescribed psych meds. Pt has a hx of polysubstance abuse in the past and was last in CD treatment when she was in her 30's. Pt has limited resources and psychosocial support. Pt reported that she started to have SI thoughts when she was heavily intoxicated yesterday evening and woke up this morning realizing that she needed to go to get help at the hospital instead of drinking the bottle of henessy that was next to her. Pt denied having an SI plan or intent when experiencing SI yesterday, but thought she might drink herself to death if she continued to use drugs and alcohol. Pt reported that she has dealt with anxiety and depression symptoms for a long time, but has been able to manage " them on her own without any treatment. Pt reported of having one previous SI attempt a couple of years ago and started to overdose on percocet, but stopped herself from taking too many and did not require hospitalization. Pt denied having any SI thoughts today and expressed desire to get treatment. Pt expressed concerns that she would go back to using drugs and alcohol if she discharged back to the motel where she was staying before. Pt expressed having a lot of guilt and remorse about relapsing and wanted to move forward with getting treatment for her mental health and sobriety..      Informed Consent and Assessment Methods  Explained the crisis assessment process, including applicable information disclosures and limits to confidentiality, assessed understanding of the process, and obtained consent to proceed with the assessment.  Assessment methods included conducting a formal interview with patient, review of medical records, collaboration with medical staff, and obtaining relevant collateral information from family and community providers when available.  : done     Patient response to interventions: eager to participate, acceptance expressed, verbalizes understanding  Coping skills were attempted to reduce the crisis:  help seeking, future oriented     History of the Crisis   Pt previously had a long history of heavy use of drugs and alcohol when she was in her 20's and 30's. Pt has been sober for the past eight years and recently has been experiencing multiple psychosocial and environmental stressors since losing her home. Pt endorses symptoms of having difficulties sleeping, poor appetite/recent weight loss, increased depression and anxiety, passive SI yesterday. Pt reported that she was unsure if she was experiencing auditory hallucinations or if it was the voices of others in the motel that she heard crying out for help yesterday evening when she was intoxicated.    Brief Psychosocial History  Family:   Single, Children no  Support System:  Limited to, Other (specify) (Niece and Nephew)  Employment Status:  employed part-time, other (see comments) (works at [employer], currently on probation, has interview at [employer] on Monday)  Source of Income:  public assistance, salary/wages (food stamps)  Financial Environmental Concerns:  unable to afford rent/mortgage  Current Hobbies:  games, other (see comments) (playing games on phone)  Barriers in Personal Life:  mental health concerns    Significant Clinical History  Current Anxiety Symptoms:  racing thoughts, excessive worry, anxious  Current Depression/Trauma:  difficulty concentrating, crying or feels like crying, irritable, sadness, excessive guilt, thoughts of death/suicide  Current Somatic Symptoms:  anxious, excessive worry  Current Psychosis/Thought Disturbance:  impulsive, auditory hallucinations  Current Eating Symptoms:  loss of appetite, recent weight loss  Chemical Use History:  Alcohol: Other (comments), Binge (heavy binge drinking over the past three days since relapsing after 8 years of sobriety. Consumed three bottles of renita with a friend yesterday)  Last Use:: 05/01/24  Benzodiazepines: None  Opiates: None  Cocaine: Other (comments), Snorted (heavy use of cocaine over past 3 days after 8 years of sobriety.)  Last Use:: 05/01/24  Marijuana: None  Other Use: None   Past diagnosis:  Substance Use Disorder  Family history:  No known history of mental health or chemical health concerns  Past treatment:  AA/NA, Other (Rehab/[facility] treatment in 20's/30's)  Details of most recent treatment:  Rehab/[facility] treatment in 20's/30's, no mental health treatment hx     Risk Assessment  [place] Suicide Severity Rating Scale Full Clinical Version:  Suicidal Ideation  Q1 Wish to be Dead (Lifetime): Yes  Q2 Non-Specific Active Suicidal Thoughts (Lifetime): Yes  3. Active Suicidal Ideation with any Methods (Not Plan) Without Intent to Act (Lifetime): Yes  Q4 Active  Suicidal Ideation with Some Intent to Act, Without Specific Plan (Lifetime): Yes  Q5 Active Suicidal Ideation with Specific Plan and Intent (Lifetime): No  Q6 Suicide Behavior (Lifetime): no     Suicidal Behavior (Lifetime)  Actual Attempt (Lifetime): Yes (attempted overdose of percocet a couple of years ago)  Total Number of Actual Attempts (Lifetime): 1  Has subject engaged in non-suicidal self-injurious behavior? (Lifetime): No  Interrupted Attempts (Lifetime): No  Aborted or Self-Interrupted Attempt (Lifetime): No  Preparatory Acts or Behavior (Lifetime): No    [place] Suicide Severity Rating Scale Recent:   Suicidal Ideation (Recent)  Q1 Wished to be Dead (Past Month): yes  Q2 Suicidal Thoughts (Past Month): yes  Q3 Suicidal Thought Method: yes  Q4 Suicidal Intent without Specific Plan: yes  Q5 Suicide Intent with Specific Plan: no  Level of Risk per Screen: high risk  Intensity of Ideation (Recent)  Most Severe Ideation Rating (Past 1 Month): 3  Frequency (Past 1 Month): Less than once a week  Duration (Past 1 Month): Less than 1 hour/some of the time  Controllability (Past 1 Month): Can control thoughts with little difficulty  Deterrents (Past 1 Month): Deterrents probably stopped you  Reasons for Ideation (Past 1 Month): Mostly to end or stop the pain (You couldn't go on living with the pain or how you were feeling)  Suicidal Behavior (Recent)  Actual Attempt (Past 3 Months): No  Total Number of Actual Attempts (Past 3 Months): 0  Has subject engaged in non-suicidal self-injurious behavior? (Past 3 Months): No  Interrupted Attempts (Past 3 Months): No  Total Number of Interrupted Attempts (Past 3 Months): 0  Aborted or Self-Interrupted Attempt (Past 3 Months): No  Total Number of Aborted or Self-Interrupted Attempts (Past 3 Months): 0  Preparatory Acts or Behavior (Past 3 Months): No  Total Number of Preparatory Acts (Past 3 Months): 0    Environmental or Psychosocial Events: challenging interpersonal  relationships, unstable housing, homelessness, other life stressors, impulsivity/recklessness, ongoing abuse of substances  Protective Factors: Protective Factors: help seeking, optimistic outlook - identification of future goals    Does the patient have thoughts of harming others? Feels Like Hurting Others: no  Previous Attempt to Hurt Others: no  Is the patient engaging in sexually inappropriate behavior?: no    Is the patient engaging in sexually inappropriate behavior?  no        Mental Status Exam   Affect: Appropriate (tearful at times)  Appearance: Appropriate  Attention Span/Concentration: Attentive  Eye Contact: Engaged    Fund of Knowledge: Appropriate   Language /Speech Content: Fluent  Language /Speech Volume: Normal  Language /Speech Rate/Productions: Normal  Recent Memory: Intact  Remote Memory: Intact  Mood: Anxious  Orientation to Person: Yes   Orientation to Place: Yes  Orientation to Time of Day: Yes  Orientation to Date: Yes     Situation (Do they understand why they are here?): Yes  Psychomotor Behavior: Normal  Thought Content: Clear  Thought Form: Intact, Goal Directed     Mini-Cog Assessment  Number of Words Recalled:    Clock-Drawing Test:     Three Item Recall:    Mini-Cog Total Score:       Medication  Psychotropic medications:   Medication Orders - Psychiatric (From admission, onward)      None             Current Care Team  Patient Care Team:  No Ref-Primary, Physician as PCP - Charles Dimas MD as Assigned Musculoskeletal Provider    Diagnosis  Patient Active Problem List   Diagnosis Code    Trichomoniasis A59.9    Upper Back Pain (Between Shoulder Blades) M54.6    Chest Pain R07.9    Adjustment disorder with mixed anxiety and depressed mood F43.23    Nondependent alcohol abuse, episodic drinking behavior F10.10    Nondependent cocaine abuse, episodic (H) F14.10       Primary Problem This Admission  Active Hospital Problems    *Adjustment disorder with mixed anxiety and depressed  mood      Nondependent alcohol abuse, episodic drinking behavior      Nondependent cocaine abuse, episodic (H)    Primary Diagnosis This Admission: F43.23    Clinical Summary and Substantiation of Recommendations   After therapeutic assessment, intervention and aftercare planning by ED care team and [facility] and in consultation with attending provider, the patient's circumstances and mental state were appropriate for transitioning to [facility] at  and [facility] treatment. After this is set up, the pt is recommended to follow up with a primary care provider for establishment and wellness check up and psychiatric provider for a medication evaluation.   A this time the pt is not presenting as an acute risk to self or others due to the following factors: pt does not endorse current SI or SI plan, is help seeking and future oriented and is wanting to stay sober and get [facility] treatment after relapsing on alcohol and cocaine three days ago after 8 years of sobriety. Pt has multiple psychosocial stressors and limited support and resources and has been staying in a motel with several drug addicts after recently losing her home. Hence, [facility] is recommended for further stabilization in an environment that fosters sobriety.                      Patient coping skills attempted to reduce the crisis:  help seeking, future oriented    Disposition  Recommended disposition:  ([facility] Referral)        Reviewed case and recommendations with attending provider. Attending Name: Thomas Thayer MD       Attending concurs with disposition: yes       Patient and/or validated legal guardian concurs with disposition:   yes       Final disposition:  Pt will either transfer to a crisis bed if available or stay overnight in the ED with follow up from  to assist with getting into [facility] program or further evaluate other alternatives.     Legal status on admission:      Assessment Details   Total duration spent with the  patient: 30 min     CPT code(s) utilized: 25091 - Psychotherapy for Crisis - 60 (30-74*) min    Enid Baer MA, CHRIS, LUZMA, Psychotherapist  [facility] - Triage & Transition Services  Callback: [phone]

## 2024-05-02 NOTE — CONSULTS
Met with pt for CD Consult and completed [facility] Comprehensive Assessment recommending residential LOC.  Pt is requesting residential [facility], faxing referrals to [facility] ([facility]) and [facility] per pt's verbal consent, faxing ROIs to ED for pt signature.  It is this writer's opinion that pt would benefit from [facility] admission if possible.    Recommendations:  1)  Complete a Residential CD Treatment Program  2)  Abstain from all mood-altering chemicals unless prescribed by a licensed provider.   3)  Attend, at minimum, 2 weekly support group meetings, such as 12 step based (AA/NA), SMART Recovery, Health Realizations, and/or Refuge Recovery meetings.     4)  Actively work with a mentor/sponsor on a weekly basis.   5)  Follow all the recommendations of your treatment/medical providers.  6)  Patient may benefit from obtaining a full mental health evaluation.    Clinical Substantiation:  Patient has unstable housing, lacks a sober living environment, would benefit from developing long-term sober maintenance skills, would benefit from developing sober coping skills, would benefit from developing a sober peer support network, and has mental health symptoms which are exacerbated by substance abuse.    Referrals/ Alternatives:  [facility]  [address] [place] [address]  P: [phone]  Office: [phone]  F: [phone]    [facility]  [address]  [facility] [address]  P: [phone]  F: [phone]    DAANES Assessment ID: [id number]     [facility] consult completed by:   RAYNE Pa, [facility]  Substance Use Disorder Evaluation Counselor  E-mail Address: [email or web address] Consult & Liaison Department  [facility], [address] [place] [address]     *Due to regulation of Title 42 of the Code of Federal Regulations (CFR) Part 2: Confidentiality laws  apply to this note and the information wherein.  Thus, this note cannot be copy and pasted into any other health care staff's note nor can it be included in general medical records sent to ANY outside agency without the patient's written consent.

## 2024-05-02 NOTE — ED TRIAGE NOTES
Arrives ambulatory with a need for drug/alcohol assessment. Per patient she is interested in going to a rehab. Per patient last use of cocaine and alcohol 3 days ago.     Triage Assessment (Adult)       Row Name 05/02/24 0944          Triage Assessment    Airway WDL WDL        Respiratory WDL    Respiratory WDL WDL        Skin Circulation/Temperature WDL    Skin Circulation/Temperature WDL WDL        Cardiac WDL    Cardiac WDL WDL        Peripheral/Neurovascular WDL    Peripheral Neurovascular WDL WDL        Cognitive/Neuro/Behavioral WDL    Cognitive/Neuro/Behavioral WDL WDL

## 2024-05-02 NOTE — ED NOTES
D: Alcohol use disorder  Cocaine use  Pituitary tumor    I: Monitored vitals and assessed pt status.   Changed: None  Running: None  PRN: None    A: A0x4. VSS, on RA, Sat>98%, Afebrile. Denies pain. Currently registered for observation, waiting for bed on [place] side for treatment    I/O this shift:  In: 2540 [P.O.:2540]  Out: -     Temp:  [97.4  F (36.3  C)-97.8  F (36.6  C)] 97.8  F (36.6  C)  Pulse:  [52-67] 52  Resp:  [18] 18  BP: (111-157)/(80-95) 157/95  SpO2:  [92 %-100 %] 100 %      P: Continue to monitor Pt status and report changes to treatment team.
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
## 2024-05-03 ENCOUNTER — BEH TREATMENT PLAN (OUTPATIENT)
Dept: BEHAVIORAL HEALTH | Facility: CLINIC | Age: 59
End: 2024-05-03
Attending: FAMILY MEDICINE

## 2024-05-03 ENCOUNTER — TELEPHONE (OUTPATIENT)
Dept: BEHAVIORAL HEALTH | Facility: CLINIC | Age: 59
End: 2024-05-03
Payer: COMMERCIAL

## 2024-05-03 ENCOUNTER — TELEPHONE (OUTPATIENT)
Dept: BEHAVIORAL HEALTH | Facility: CLINIC | Age: 59
End: 2024-05-03

## 2024-05-03 ENCOUNTER — HOSPITAL ENCOUNTER (OUTPATIENT)
Dept: BEHAVIORAL HEALTH | Facility: CLINIC | Age: 59
Discharge: HOME | End: 2024-05-03
Attending: FAMILY MEDICINE
Payer: COMMERCIAL

## 2024-05-03 VITALS
TEMPERATURE: 97.8 F | WEIGHT: 129 LBS | DIASTOLIC BLOOD PRESSURE: 99 MMHG | HEART RATE: 88 BPM | OXYGEN SATURATION: 100 % | RESPIRATION RATE: 14 BRPM | BODY MASS INDEX: 20.73 KG/M2 | HEIGHT: 66 IN | SYSTOLIC BLOOD PRESSURE: 120 MMHG

## 2024-05-03 DIAGNOSIS — F32.9 MDD (MAJOR DEPRESSIVE DISORDER): Primary | ICD-10-CM

## 2024-05-03 DIAGNOSIS — F19.90 SUBSTANCE USE DISORDER: Primary | ICD-10-CM

## 2024-05-03 PROCEDURE — G0378 HOSPITAL OBSERVATION PER HR: HCPCS

## 2024-05-03 PROCEDURE — H2035 A/D TX PROGRAM, PER HOUR: HCPCS

## 2024-05-03 PROCEDURE — 1002N00001 HC LODGING PLUS FACILITY CHARGE ADULT

## 2024-05-03 PROCEDURE — 99245 OFF/OP CONSLTJ NEW/EST HI 55: CPT | Performed by: PSYCHIATRY & NEUROLOGY

## 2024-05-03 PROCEDURE — 99238 HOSP IP/OBS DSCHRG MGMT 30/<: CPT | Performed by: FAMILY MEDICINE

## 2024-05-03 PROCEDURE — 250N000013 HC RX MED GY IP 250 OP 250 PS 637: Performed by: EMERGENCY MEDICINE

## 2024-05-03 RX ORDER — LORATADINE 10 MG/1
10 TABLET ORAL DAILY
COMMUNITY

## 2024-05-03 RX ORDER — AMOXICILLIN 250 MG
2 CAPSULE ORAL DAILY PRN
COMMUNITY

## 2024-05-03 RX ORDER — IBUPROFEN 200 MG
600 TABLET ORAL EVERY 6 HOURS PRN
COMMUNITY

## 2024-05-03 RX ORDER — MAGNESIUM HYDROXIDE/ALUMINUM HYDROXICE/SIMETHICONE 120; 1200; 1200 MG/30ML; MG/30ML; MG/30ML
30 SUSPENSION ORAL EVERY 6 HOURS PRN
COMMUNITY

## 2024-05-03 RX ORDER — GUAIFENESIN/DEXTROMETHORPHAN 100-10MG/5
10 SYRUP ORAL 4 TIMES DAILY PRN
COMMUNITY

## 2024-05-03 RX ORDER — MIRTAZAPINE 15 MG/1
15 TABLET, FILM COATED ORAL AT BEDTIME
Qty: 30 TABLET | Refills: 1 | Status: SHIPPED | OUTPATIENT
Start: 2024-05-03 | End: 2024-05-06

## 2024-05-03 RX ORDER — ACETAMINOPHEN 500 MG
500-1000 TABLET ORAL EVERY 8 HOURS PRN
COMMUNITY

## 2024-05-03 RX ADMIN — NICOTINE 1 PATCH: 14 PATCH, EXTENDED RELEASE TRANSDERMAL at 07:38

## 2024-05-03 ASSESSMENT — ACTIVITIES OF DAILY LIVING (ADL)
ADLS_ACUITY_SCORE: 35

## 2024-05-03 ASSESSMENT — ANXIETY QUESTIONNAIRES
4. TROUBLE RELAXING: NEARLY EVERY DAY
2. NOT BEING ABLE TO STOP OR CONTROL WORRYING: MORE THAN HALF THE DAYS
5. BEING SO RESTLESS THAT IT IS HARD TO SIT STILL: NOT AT ALL
1. FEELING NERVOUS, ANXIOUS, OR ON EDGE: NEARLY EVERY DAY
IF YOU CHECKED OFF ANY PROBLEMS ON THIS QUESTIONNAIRE, HOW DIFFICULT HAVE THESE PROBLEMS MADE IT FOR YOU TO DO YOUR WORK, TAKE CARE OF THINGS AT HOME, OR GET ALONG WITH OTHER PEOPLE: SOMEWHAT DIFFICULT
6. BECOMING EASILY ANNOYED OR IRRITABLE: SEVERAL DAYS
3. WORRYING TOO MUCH ABOUT DIFFERENT THINGS: NEARLY EVERY DAY
GAD7 TOTAL SCORE: 13
GAD7 TOTAL SCORE: 13
7. FEELING AFRAID AS IF SOMETHING AWFUL MIGHT HAPPEN: SEVERAL DAYS

## 2024-05-03 ASSESSMENT — COLUMBIA-SUICIDE SEVERITY RATING SCALE - C-SSRS
6. HAVE YOU EVER DONE ANYTHING, STARTED TO DO ANYTHING, OR PREPARED TO DO ANYTHING TO END YOUR LIFE?: NO
3. HAVE YOU BEEN THINKING ABOUT HOW YOU MIGHT KILL YOURSELF?: YES
6. HAVE YOU EVER DONE ANYTHING, STARTED TO DO ANYTHING, OR PREPARED TO DO ANYTHING TO END YOUR LIFE?: YES
1. SINCE LAST CONTACT, HAVE YOU WISHED YOU WERE DEAD OR WISHED YOU COULD GO TO SLEEP AND NOT WAKE UP?: NO
1. IN THE PAST MONTH, HAVE YOU WISHED YOU WERE DEAD OR WISHED YOU COULD GO TO SLEEP AND NOT WAKE UP?: YES
TOTAL  NUMBER OF INTERRUPTED ATTEMPTS SINCE LAST CONTACT: NO
TOTAL  NUMBER OF ABORTED OR SELF INTERRUPTED ATTEMPTS SINCE LAST CONTACT: NO
ATTEMPT SINCE LAST CONTACT: NO
SUICIDE, SINCE LAST CONTACT: NO
1. IN THE PAST MONTH, HAVE YOU WISHED YOU WERE DEAD OR WISHED YOU COULD GO TO SLEEP AND NOT WAKE UP?: YES
2. HAVE YOU ACTUALLY HAD ANY THOUGHTS OF KILLING YOURSELF LIFETIME?: YES
4. HAVE YOU HAD THESE THOUGHTS AND HAD SOME INTENTION OF ACTING ON THEM?: NO
2. HAVE YOU ACTUALLY HAD ANY THOUGHTS OF KILLING YOURSELF?: NO
5. HAVE YOU STARTED TO WORK OUT OR WORKED OUT THE DETAILS OF HOW TO KILL YOURSELF? DO YOU INTEND TO CARRY OUT THIS PLAN?: NO
2. HAVE YOU ACTUALLY HAD ANY THOUGHTS OF KILLING YOURSELF IN THE PAST MONTH?: YES

## 2024-05-03 ASSESSMENT — PATIENT HEALTH QUESTIONNAIRE - PHQ9: SUM OF ALL RESPONSES TO PHQ QUESTIONS 1-9: 15

## 2024-05-03 NOTE — PROGRESS NOTES
Progress Note    This patient had a IP Substance Use Disorder assessment on 5/2/2024 completed by CANDIE Pa.  This patient was seen for a face to face update of the IP Substance Use Disorder assessment on 5/3/2024 by CANDIE Pike.  INSIDE: The patient's IP Substance Use Disorder assessment completed on 5/2/2024 is in the patient's electronic medical record in Epic in the Chart Review section under the Notes/Trans Tab.    Alcohol/Drug use since the last CD evaluation (include date of last use):     No additional substances use since the last CD evaluation     Please note any other clinical changes since the last CD evaluation (such as medication changes, additional legal charges, detoxification admissions, overdoses, etc.)     No significant changes since the last CD evaluation       ASAM Dimensions Original scores Current Scores   I.) Intoxication and Withdrawal: 1 1   II.) Biomedical:  1 1   III.) Emotional and Behavioral:  2 2   IV.) Readiness to Change:  2 2   V.) Relapse Potential: 4 4   VI.) Recovery Environmental: 4 4     Please list clinical justifications for the above ASAM score changes since the original comprehensive assessment:     None of the ASAM scores on the six dimensions had changed since the IP Substance Use Disorder assessment was completed on 5/2/2024.       Current YRDER: Current UA:     0.000     Positive for Cocaine and Methamphetamine and negative for all other screened drugs.       PHQ-9, ESTEPHANIE-7   PHQ-9 on 5/3/2024 ESTEPHANIE-7 on 5/3/2024   The patient's PHQ-9 score was 15 out of 27, indicating moderately severe depression.   The patient's ESTEPHANIE-7 score was 13 out of 21, indicating moderate anxiety.       Glascock-Suicide Severity Rating Scale Reassessment   Have you ever wished you were dead or that you could go to sleep and not wake up?  Past Month:  Yes     Have you actually had any thoughts of killing yourself?  Past Month:  Yes     Have you been thinking about how you might do this?      Past Month:  Yes, Describe: Pt reports that she had thoughts of overdosing   Lifetime:  No   Have you had these thoughts and had some intention of acting on them?     Past Month:  No   Lifetime:  No   Have you started to work out the details of how to kill yourself?   Past Month:  No   Lifetime:  Yes, Describe: Pt reports she attempted to overdose in college   Do you intend to carry out this plan?   No     When you have the thoughts how long do they last?  Fleeting - few seconds or minutes     Are there things - anyone or anything (i.e. family, Orthodoxy, pain of death) that stopped you from wanting to die or acting on thoughts of suicide?  Uncertain that protective factors stopped you       2008  [employer] [facility]  Used with permission by Mirna Zambrano, PhD.       Guide to C-SSRS Risk Ratings   NO IDEATION:  with no active thoughts IDEATION: with a wish to die. IDEATION: with active thoughts. Risk Ratings   If Yes No No 0 - Very Low Risk   If NA Yes No 1 - Low Risk   If NA Yes Yes 2 - Low/moderate risk   IDEATION: associated thoughts of methods without intent or plan INTENT: Intent to follow through on suicide PLAN: Plan to follow through on suicide Risk Ratings cont...   If Yes No No 3 - Moderate Risk   If Yes Yes No 4 - High Risk   If Yes Yes Yes 5 - High Risk   The patient's ADDITIONAL RISK FACTORS and lack of PROTECTIVE FACTORS may increase their overall suicide risk ratings.     Additional Risk Factors:   Significant history of having untreated or poorly treated mental health symptoms    Significant history of physical illness or chronic medical problems    Significant history of untreated or poorly treated chronic pain issues    Tendency to be socially isolated and/or cut off from the support of others    A recent death of someone close to the patient and/or unresolved grief and loss issues    A recent loss that was significant to the patient, i.e. loss of job, loss of home,  divorce, break-up, etc.    Significant history of trauma and/or abuse issues    History of impulsive or aggressive behaviors    No additional risk factors   Protective Factors:   Having people in his/her life that would prevent the patient from considering a suicide attempt (i.e. young children, spouse, parents, etc.)    A positive relationship with his/her clinical medical and/or mental health providers    Having easy access to supportive family members    Having a good community support network    Having cultural, Advent or spiritual beliefs that discourage suicide    No significant protective factors     Risk Status   2. - Low/moderate risk: Document in Epic / SBAR to counselor     Additional information to support suicide risk rating: There was no additional information to provide at this time.

## 2024-05-03 NOTE — TELEPHONE ENCOUNTER
Writer reviewed assessment. Writer sent pt to [facility] for benefits verification, and added pt to waitlist. Writer discussed pt with nursing staff who verbally approved her to transfer to .

## 2024-05-03 NOTE — PROGRESS NOTES
[facility] Assessment      Vital signs:     There were no vitals taken for this visit.      Transfer from [place] ED    Counselor: Jessica  Drug of Choice: Alcohol, cocaine, Marijuana  Last use: 4/29/24  Home clinic/MD:   [address]  [phone]    Psychiatrist/therapist: none    Medical history/current conditions:  Alejandrina Clement is a 59 year old female who reports having a distant history of pituitary tumor and a distant history of alcohol and drug abuse has been sober for 8 years. Reports relapsing using consistent alcohol and nasal and smoking cocaine over the past 3 days.     H&P Screen:  H&P within the last 90 days: Yes.  Date: 5/3/24 Location: ED / [facility]      Mental Health diagnosis: Major Depressive Disorder, Single Episode, Moderate With anxious distress  R/O PTSD   Alcohol and cocaine use disorders     Pt saw Dr Busby while in ED and please see following recommendations:  I would try Remeron (mirtazepine) 15 mg HS. She was warned of morning grogginess for a few days after starting.   If the doesn't tolerate Remeron she could be switched to Lexapro 10 mg per day and trying Seroquel starting at 25 mg HS for sleep     Medication compliant?: yes  Recent sucidal thoughts? Pt states that with her last relapse she felt suicidal and tried to overdose with alcohol, cocaine and marijuana      When? [place] ED  Current thought of self-harm? no    Plan? na    Pain assessment:   Pt. Experiencing pain at this time?  Yes.  Rating on 0-10 scale: (1-10 scale): 6.  Location: lower back  Chronic  Result of: arthritis .   Pt is aware of OTC availability in Med room    LP Falls assessment    Has patient had a fall(s) in the last 6 months?  No  If yes, what were the circumstances surrounding the fall(s)? NA  Does patient have gait dysfunctions? (limping, dragging of toes, shuffling feet, unsteadiness, difficulty standing /walking)   No  Does patient appear to have deconditioning/muscle loss/malnutrition/fatigue? (due to inactivity, bedrest (long hospitalization), medical condition(s) No  Does patient experience confusion, dizziness or vertigo? No  Is patient visually impaired? No   Does patient have any adaptive equipment (hearing aids, wheelchair, walker, prosthesis, crutches, cane, etc..) No  Is patient taking 2 or more of the following medications?  anticonvulsants, anti-hypertensives, diuretics, laxatives, sedatives, and psychotropics (single-select) No  Is patient taking medication (s) that would cause urinary or bowel urgency (ex: lactulose/Furosemide)? No  Does patient have a medical condition (ex: Diabetes, Liver Disease, Respiratory Diseases, Chronic Pain, Heart Disease, Neuropathy, Seizure like Disorder, etc...) that could affect balance/gait or risk for falls? No    If yes to any of the above educate patient on fall prevention while at [facility].           Floors clutter/obstacle free           Proper footwear/[place] shoes          Encourage the use of appropriate lighting.            Adjust walking speed accordingly          Recommend caution going on longer walks. Try shorter walks and see how you do first.  Use elevators when appropriate.          Notify staff if you are experiencing fatigue, weakness, drowsiness/ dizziness or have had a fall.           Use adaptive equipment as recommended          Wheelchairs must be managed and propelled independently without staff assistance           Expectation of complete independence with all cares and compliance.  Report to staff if having difficulty     LP patient who poses a potential falls risk will be advised of the following:  Please follow the recommendations as listed above or it may affect your treatment plan.  Your treatment team would have to evaluate if this level of care is appropriate for you.    Patient acknowledges and verbalizes understanding of the above criteria?  Yes  Support staff / counselors notified of pt Fall Screen and plan of prevention. LPRN to re-assess as appropriate. White board and SBAR updated  low risk  ____________________________________________________________________________________________________________________________  RN Assessment of Infectious Disease concerns:    Infectious disease concerns None    ____________________________________________________________________________________________________________________________     Community Medical Screen for COVID-19    Do you have any of the following NEW or worsening symptoms NOT attributed to pre-existing conditions?    No    Fever of 100.0  F (37.8 C) or over  Chills  Cough  Shortness of Breath  Loss of taste or smell  Generalized body aches  Persistent headache  Sore throat (or trouble eating or drinking in young children?)  Nausea, Vomiting, or diarrhea (loose stools)    If pt responds YES to any of the symptoms / Positive COVID-19  without symptoms pt will need to leave unit immediately and can return in 6 days from discharge date.      Did you test positive for COVID-19 in the last 10 days or are you waiting on the test results due to an exposure or symptoms?  No    Has anyone told you to self-quarantine due to exposure to someone with COVID-19?  No    COVID - 9 positive patients must quarantine for five days.  They can return to in-person therapy on day 6 following Duration of Special Precautions for COVID-19.      COVID-19 Test completed by LPRN ? No    COVID-19 - Pt informed of the following while at LP:    1) If pt has any of the symptoms below, notify staff immediately.    Fever   Cough   Shortness of breath or difficulty breathing   Chills   Repeated shaking with chills  Muscle pain   ____________________________________________________________________________________________________________________________    RN Assessment of Patient's Ability to Safely Manage and Self-Administer  Respiratory Treatments    Has experience in the management of Respiratory (If NA, indicate and move to Integrative Therapies):  NA    Integrative Therapies: Essential Oils    Patient requesting essential oil inhaler to manage (Mood/Mental Health/Physical/Spiritual symptoms).     Discussed appropriate use of essential oil inhalers and instructed patient not to leave labeled product out on unit.     Patient was screened for kidney disease, asthma/reactive airway disease and rashes and wounds or 1st trimester of pregnancy    List Essential Oils requested by pt [place] LIMIT ONE ESSENTIAL OIL PER PT    Patient verbalized and demonstrated understanding of how to use essential oil inhaler correctly and will notify LP RN with any concerns or side effects. Patient agrees not to share their essential oil inhaler with other clients.  Continue to support the patient in safely utilizing integrative therapies as able to manage symptoms during treatment.     Patient tobacco use:    Do you use tobacco? yes   Type? cigarettes  How often? daily  How much? 7   Are you interested in quitting? yes    NRT (Nicotine Replacement therapy) ordered? yes   Pt is aware of the dangers of tobacco cessation and in contemplation.    Pt given written education.    Nutritional Assessment:    Have you ever purged, binged or restricted yourself as a way to control your weight?   No     Are you on a special diet?   No     Do you have any concerns regarding your nutritional status?   No     Have you had any appetite changes in the last 3 months?   No   Have you had weight loss or weight gain of more than 10 lbs in the last 3 months?   If patient gained or lost more than 10 lbs, then refer to program RN / attending Physician for assessment.   No   Was the patient informed of BMI?    Normal, No Intervention   Yes   Have you engaged in any risk-taking behavior that would put you at risk for exposure to blood-borne or sexually transmitted diseases?   No    Do you have any dental problems?   No. Pt has dentures       LPRN reviewed with pt the following information:  Yes  Pt informed if leaving AMA, they will be directed to take medications with them.  Should pt's choose to leave medications at LP, ALL medications will be packaged and delivered to inpatient pharmacy for temporary storage.  Inpt pharmacy will follow protocol to reach out to pt.  If pharmacy unable to reach pt and/or pt does not retrieve medications, they will be destroyed per inpt pharmacy protocol.   In regards to 'medical concerns/medication refill expectations' while at LP:  Pt understands LP has no rounding/managing provider to assess medical issues or to refill medications.  Pt's instructed to make virtual/phone appt/s with community provider/s and notify LPRN of date and time  Pt's understand they may not leave LP to attend any medical appt's.   Pt's understand they are responsible for having a plan to refill medications and to allow time to troubleshoot.      Pt verbalizes understanding of the above criteria yes    Nursing Assessment Summary:  As above    On-going nursing intervention required?   No    Acute care visit recommended: yes.    Referral for addiction medicine requested    [facility]  Nurse Liaison / CD Adult [facility]  O: [phone]  Fax:[phone]  LPRN Pool [id number]  M-F: 7AM to 5:30PM   Sat-Sun: 7AM to 3PM  After hours: [phone]

## 2024-05-03 NOTE — PROGRESS NOTES
Name: Alejandrina Clement  Date: 5/3/2024  Medical Record: [id number]    Envelope Number: [id number]    List of Contents (List each item separately in new row):   Cell phone and     Admission:  I am responsible for any personal items that are not sent to the safe or pharmacy.  [facility] is not responsible for loss, theft or damage of any property in my possession.      Patient Signature:  ___________________________________________       Date/Time:__________________________    Staff Signature: __________________________________       Date/Time:__________________________    [facility] Staff person, if patient is unable/unwilling to sign:      __________________________________________________________       Date/Time: __________________________      **All medications are packaged by LP staff and securely stored on [facility]. Medications left by patients at discharge will be packaged by LP staff and transported by LP staff to inpatient pharmacy for storage.**        Discharge:  [facility] has returned all of my personal belongings:    Patient Signature: ________________________________________     Date/Time: ____________________________________    Staff Signature: ______________________________________     Date/Time:_____________________________________

## 2024-05-03 NOTE — TELEPHONE ENCOUNTER
[facility] Psychiatry Clinic Appointment Request     Type of Request: Psychiatry / Addiction Medicine at: whoever can see her first she saw Psychiatrist Dr Busby in the ED and he asked that she have psychiatric follow up   [facility] Psychiatry, [facility], 2nd floor [address]  [address]  [address]    [phone]    [facility] nurse contact phone number: [phone]    Pt is currently attending [facility] residential treatment and requesting long term psychiatric/addiction medicine stabilization/service.     Diagnosis: Alcohol Use Disorder (AUD) and Cocaine Use Disorder    MH Diagnosis: PTSD, Recent SI, depression          Needs: NO    Requesting in-person appt.  Pt discharge date from [facility] is: 5/31    Clinician Gender Preference (if applicable): JENNI    [facility]  Nurse Liaison / CD Adult [facility] ()  [address] [place] [address]  O:[phone]  F:[phone]  [facility] [id number]

## 2024-05-03 NOTE — PROGRESS NOTES
Initial Services Plan    Before your first treatment group, please do the following    Immediate health & safety concerns: Look for sober housing and a supportive social network.  Look for a support network (such as AA, NA, DBT group, a Yazidism group, etc.)  Other: anxiety, obtain a DA in first 10 days    Suggestions for client during the time between intake & completion of treatment plan:  Tour your treatment center (unit or outpatient clinic).  Introduce yourself to the treatment group.  Spend time getting to know your peers.  Complete the problem list for your treatment plan.  Start drug and alcohol use history.  Review your patient or client handbook.    Client issues to be addressed in the first treatment sessions:  Identify motivations(s) for coming to treatment, i.e. legal, family, job, self  Identify outside concerns that may interfere with treatment, i.e. bills not getting paid, homesick for children    Maria Elena Harmon, [facility]  5/3/2024

## 2024-05-03 NOTE — PROGRESS NOTES
"Triage and Transition Services Extended Care Reassessment     Patient: Alejandrina goes by \"Alejandrina,\" uses she/her pronouns  Date of Service: May 3, 2024  Site of Service: [facility] EMERGENCY DEPARTMENT                             ED20  Patient was seen yes  Mode of Assessment: Virtual: [facility]     Reason for Reassessment: substance use, depression    History of Patient's Original Emergency Room Encounter: Pt previously had a long history of heavy use of drugs and alcohol when she was in her 20's and 30's. Pt has been sober for the past eight years and recently has been experiencing multiple psychosocial and environmental stressors since losing her home. Pt endorses symptoms of having difficulties sleeping, poor appetite/recent weight loss, increased depression and anxiety, passive SI yesterday. Pt reported that she was unsure if she was experiencing auditory hallucinations or if it was the voices of others in the motel that she heard crying out for help yesterday evening when she was intoxicated.    Current Patient Presentation: Pt is alert, oriented x4, calm, and cooperative. Affect is flat and eye contact appropriate. Mood is sad and depressed. She denies having SI or HI. Pt expresses an acceptance and willingness for [facility] treatment.    Presentation Summary: Pt is seen by extended care for therapeutic check-in and reassessment. Exchanged greeting, introduced self and role. Discussed with her about hopes and dreams, strengths and supports, and provided validation to thoughts and emotions. Pt is able to identify strenghts, protective factors, and able to identify goals for finding purpose and meaning in life.    Changes Observed Since Initial Assessment: decrease in presenting symptoms    Therapeutic Interventions Provided: Engaged in safety planning, Engaged in guided discovery, explored patient's perspectives and helped expand them through socratic dialogue., Engaged in cognitive restructuring/ reframing, " looked at common cognitive distortions and challenged negative thoughts.    Current Symptoms: anxious, excessive worry sadness, helplessness, hoplessness          Mental Status Exam   Affect: Appropriate  Appearance: Appropriate  Attention Span/Concentration: Attentive  Eye Contact: Engaged    Fund of Knowledge: Appropriate   Language /Speech Content: Fluent  Language /Speech Volume: Normal  Language /Speech Rate/Productions: Normal  Recent Memory: Intact  Remote Memory: Intact  Mood: Anxious  Orientation to Person: Yes   Orientation to Place: Yes  Orientation to Time of Day: Yes  Orientation to Date: Yes     Situation (Do they understand why they are here?): Yes  Psychomotor Behavior: Normal  Thought Content: Clear  Thought Form: Intact, Goal Directed    Treatment Objective(s) Addressed: rapport building, processing feelings, safety planning, assessing safety    Patient Response to Interventions: acceptance expressed, verbalizes understanding    Progress Towards Goals:  Patient Reports Symptoms Are: stable  Patient Progress Toward Goals: other  Comment: Pt is appropriate for outpatient services or [facility] treatment programming. She is motivated for change and seeking help.  Next Step to Work Toward Discharge: awaiting acceptance at community level of care  Awaiting Acceptance at [facility] Level of Care Comment: Referrals have been made to [facility], [facility], and to the [facility].    Case Management:      C-SSRS Since Last Contact:   1. Wish to be Dead (Since Last Contact): No  2. Non-Specific Active Suicidal Thoughts (Since Last Contact): No     Actual Attempt (Since Last Contact): No  Has subject engaged in non-suicidal self-injurious behavior? (Since Last Contact): No  Interrupted Attempts (Since Last Contact): No  Aborted or Self-Interrupted Attempt (Since Last Contact): No  Preparatory Acts or Behavior (Since Last Contact): No  Suicide (Since Last Contact): No     Calculated C-SSRS Risk Score (Since  Last Contact): No Risk Indicated    Plan: Final Disposition / Recommended Care Path: observation (Pt will either be transferring to a crisis bed if there is availability/accepted tonight, otherwise will stay overnight in the ED and be seen by EC team to assist in getting set up for [facility] during business hours and if they have availability.)  Plan for Care reviewed with assigned Medical Provider: yes  Plan for Care Team Review: provider  Comments: Pending results    Clinical Substantiation: After therapeutic assessment, intervention and aftercare planning by ED care team and LM and in consultation with attending provider, the patient's circumstances and mental state were appropriate for transitioning to [facility] program at  and [facility] treatment. After this is set up, the pt is recommended to follow up with a primary care provider for establishment and wellness check up and psychiatric provider for a medication evaluation.   A this time the pt is not presenting as an acute risk to self or others due to the following factors: pt does not endorse current SI or SI plan, is help seeking and future oriented and is wanting to stay sober and get [facility] treatment after relapsing on alcohol and cocaine three days ago after 8 years of sobriety. Pt has multiple psychosocial stressors and limited support and resources and has been staying in a motel with several drug addicts after recently losing her home. Hence, [facility] is recommended for further stabilization in an environment that fosters sobriety.    Legal Status: Legal Status at Admission: Voluntary/Patient has signed consent for treatment    Session Status: Time session started: 0948  Time session ended: 1006  Session Duration (minutes): 18 minutes  Session Number: 1  Anticipated number of sessions or this episode of care: 3    Session Start Time: 0948  Session Stop Time: 1006  CPT codes: 90378 - Psychotherapy (with patient) - 30 (16-37*) min  Time Spent: 18  minutes      CPT code(s) utilized: 64903 - Psychotherapy (with patient) - 30 (16-37*) min    Diagnosis:   Patient Active Problem List   Diagnosis Code    Trichomoniasis A59.9    Upper Back Pain (Between Shoulder Blades) M54.6    Chest Pain R07.9    Adjustment disorder with mixed anxiety and depressed mood F43.23    Nondependent alcohol abuse, episodic drinking behavior F10.10    Nondependent cocaine abuse, episodic (H) F14.10    Pituitary tumor D49.7    Cocaine use F14.90    Alcohol use disorder F10.90       Primary Problem This Admission: Active Hospital Problems    *Adjustment disorder with mixed anxiety and depressed mood      Nondependent alcohol abuse, episodic drinking behavior      Nondependent cocaine abuse, episodic (H)      Pituitary tumor      Cocaine use      Alcohol use disorder    F43.23 Adjustment disorder with mixed anxiety and depressed mood    Edgar Rider, [facility]   Licensed Mental Health Professional (LMHP), [facility]  [phone]

## 2024-05-03 NOTE — PROGRESS NOTES
Care Management Discharge Note    Discharge Date: 05/03/2024       Discharge Disposition:    [facility]  [address], [place],[address]    Discharge Services:  Transportation    Discharge DME:  None    Discharge Transportation:   [facility]     Private pay costs discussed: Not applicable    Does the patient's insurance plan have a 3 day qualifying hospital stay waiver?  No    PAS Confirmation Code:  N/A  Patient/family educated on Medicare website which has current facility and service quality ratings:  N/A    Education Provided on the Discharge Plan:  N/A  Persons Notified of Discharge Plans: RN  Patient/Family in Agreement with the Plan:  Yes    Handoff Referral Completed: Yes    Additional Information:  SW received call from ED Charge nurse. Pt will need a cab ride to get to [facility] before 3:00pm.     PANKAJ set up one way ride with [facility] [phone] for pt to get to [facility] [address], [place],[address].  _______________________    KALEY Hernandez, LSW  ED/OBS   [facility]  Phone: [phone]  Pager: [phone]  Fax: [phone]     On-call pager, [phone], 4:00pm to midnight     [place] [facility] Vocera     After hours Vocera [place] and After Hours Vocera [place]
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
## 2024-05-03 NOTE — ED PROVIDER NOTES
"ED Observation Discharge Summary  [facility]  Discharge Date: 5/3/2024    Alejandrina Clement MRN: [id number]   Age: 59 year old YOB: 1965     Brief HPI & Initial ED Course     Chief Complaint   Patient presents with    Drug / Alcohol Assessment     HPI  Alejandrina Clement is a 59 year old female with PMH notable for chemical dependency issues with alcohol and cocaine who presented to the ED with altered mental status. Initial history was limited due to altered mental status. The patient arrived with altered mental status with reason to suspect alcohol or other drug intoxication as etiology, and an exam that was without findings suggestive of trauma.     Upon being evaluated in the emergency department, the patient was found to have a condition that would benefit from ongoing monitoring. Observation care was initiated with plan for close clinical monitoring of the patient and her mental status for clearing of intoxicating substance, and broadening of the work-up if not clearing appropriately or if other indications develop.     See ED Observation H&P for further details on the patient's presenting history and initial evaluation.     Physical Exam   BP: 111/80  Pulse: 67  Temp: 97.4  F (36.3  C)  Resp: 18  Height: 167.6 cm (5' 6\")  Weight: 58.5 kg (129 lb)  SpO2: 92 %    Physical Exam  General: no acute distress. Alert.   HENT: MMM. Atraumatic   head.   Eyes: PERRL, normal sclerae   Neck: non-tender, supple  Cardio: Regular   rate. Regular rhythm.   Resp: Normal work of breathing, normal respiratory rate  Abdomen: no   tenderness, non-distended, no rebound, no guarding  Neuro: alert, fully oriented. Steady gait. CN II-XII grossly intact. Grossly normal strength and sensation.   Integumentary/Skin: no rash visualized, normal color    Results        Patient valuated here in the ER.  At this point we are looking for placement either crisis or to treatment center.    Through " Ortho Follow Up    Subjective:  Patient notes difficulty with therapy yesterday, but overall feels there are very small improvements. Says she will refuse surgery. Would like to get up to sitting in a chair today. No new ortho issues.    Objective:  Vitals:  Temp:  [97.6 °F (36.4 °C)-99.2 °F (37.3 °C)] 98.6 °F (37 °C)  Heart Rate:  [74-89] 88  Resp:  [16-18] 16  BP: (144-155)/(74-82) 154/78  Gen:  A&Ox3.  NAD.  Ext:   Compartments soft.  Motor function intact.  +SILT.   Extremities well-perfused.  No neuro changes.  Calves soft and symmetric.  No evidence of DVT.  Negative log roll, able to move toes and ankle without issue, 2+ PT pulse, foot well perfused, sensation intact    Labs:  HGB (g/dL)   Date Value   09/23/2022 12.0     HCT (%)   Date Value   09/23/2022 37.2     No results found for: INR    Assessment/Plan:  Nondisplaced fractures posterior wall and column right acetabulum. Doing well.  Plan for non-op treatment as long as patient can mobilize -  Patient told me this morning she would actually refuse surgery  · Pain controlled.  · DVT prophylaxis: Lovenox 30 mg BID IP  · Physical therapy/OT  · Operative extremity: FFTDWB with posterior hip precautions  · Disposition: Trauma primary. Will follow up with Dr. Morfin 2 weeks after injury    Zully Haley PA-C    Date: 9/25/2022 Time: 7:35 AM           "coordination with multiple caregivers able to arrange admission to [facility] the [facility].  Patient agrees with this plan otherwise been very stable reassessed by myself here in the ER.  At this point then was able to be discharged and transferred by cab over to [facility] close she will continue her current treatment plan for cocaine and alcohol relapse and abuse.    BP (!) 120/99   Pulse 88   Temp 97.8  F (36.6  C) (Oral)   Resp 14   Ht 1.676 m (5' 6\")   Wt 58.5 kg (129 lb)   SpO2 100%   BMI 20.82 kg/m    Examined and is unremarkable stable here in the ER      Procedures              Labs Ordered and Resulted from Time of ED Arrival to Time of ED Departure   COMPREHENSIVE METABOLIC PANEL - Abnormal       Result Value    Sodium 135      Potassium 4.4      Carbon Dioxide (CO2) 22      Anion Gap 11      Urea Nitrogen 13.3      Creatinine 0.94      GFR Estimate 70      Calcium 9.2      Chloride 102      Glucose 75      Alkaline Phosphatase 68      AST 56 (*)     ALT 38      Protein Total 7.8      Albumin 4.3      Bilirubin Total 0.6     CBC WITH PLATELETS AND DIFFERENTIAL - Abnormal    WBC Count 6.5      RBC Count 4.40      Hemoglobin 12.6      Hematocrit 38.2      MCV 87      MCH 28.6      MCHC 33.0      RDW 14.6      Platelet Count 256      % Neutrophils 15      % Lymphocytes 76      % Monocytes 7      % Eosinophils 1      % Basophils 1      % Immature Granulocytes 0      NRBCs per 100 WBC 0      Absolute Neutrophils 1.0 (*)     Absolute Lymphocytes 4.9      Absolute Monocytes 0.5      Absolute Eosinophils 0.1      Absolute Basophils 0.0      Absolute Immature Granulocytes 0.0      Absolute NRBCs 0.0     URINE DRUG SCREEN PANEL - Abnormal    Amphetamines Urine Screen Negative      Barbituates Urine Screen Negative      Benzodiazepine Urine Screen Negative      Cannabinoids Urine Screen Negative      Cocaine Urine Screen Positive (*)     Fentanyl Qual Urine Screen Negative      Opiates Urine Screen " Negative      PCP Urine Screen Negative     MAGNESIUM - Normal    Magnesium 2.1     TSH WITH FREE T4 REFLEX - Normal    TSH 1.61              Observation Course   The patient was admitted to observation status with plan for close clinical monitoring of the patient and her mental status for clearing of intoxicating substance, and broadening of the work-up if not clearing appropriately or if other indications develop.     Serial assessments of the patient's mental status were performed. Nursing notes were reviewed. During the observation period, the patient did not require medications for agitation, and did not require restraints/seclusion for patient and/or provider safety.         With monitoring, patient's mental status cleared. Patient then clinically sober with steady gait and tolerating PO. Normalization of mental status with sobering makes other causes of altered mental status very unlikely.     After counseling on the diagnosis, work-up, and treatment plan, the patient was discharged. Recommended safe cessation of EtOH/drugs and provided information on community treatment resources. Patient to follow-up with primary care in the coming days for recheck and further cessation counseling. Patient to return to the ED if any urgent or potentially life-threatening concerns.    Discharge Diagnoses:   Final diagnoses:   Alcohol use disorder   Cocaine use   Pituitary tumor       --  Ranjith Hubbard MD  [facility] EMERGENCY DEPARTMENT  5/3/2024      This note was created at least in part by the use of dragon voice dictation system. Inadvertent typographical errors may still exist.  Ranjith Hubbard MD.  Patient evaluated in the emergency department during the COVID-19 pandemic period. Careful attention to patients safety was addressed throughout the evaluation. Evaluation and treatment management was initiated with disposition made efficiently and appropriate as possible to minimize any risk of potential  exposure to patient during this evaluation.               Ranjith Hubbard MD  05/03/24 8100

## 2024-05-03 NOTE — PROGRESS NOTES
Name: Alejandrina Clement  Date: 5/3/2024  Medical Record: [id number]    Envelope Number: [id number]    List of Contents (List each item separately in new row):   Diclofenac sodium topical gel 1%    Admission:  I am responsible for any personal items that are not sent to the safe or pharmacy.  [facility] is not responsible for loss, theft or damage of any property in my possession.  Patient Signature:  ___________________________________________       Date/Time:__________________________    Staff Signature: __________________________________       Date/Time:__________________________    [facility] Staff person, if patient is unable/unwilling to sign:      __________________________________________________________       Date/Time: __________________________  **All medications are packaged by LP staff and securely stored on [facility]. Medications left by patients at discharge will be packaged by LP staff and transported by LP staff to inpatient pharmacy for storage.**  Discharge:  [facility] has returned all of my personal belongings:    Patient Signature: ________________________________________     Date/Time: ____________________________________    Staff Signature: ______________________________________     Date/Time:_____________________________________

## 2024-05-03 NOTE — DISCHARGE INSTRUCTIONS
"To go to [facility] now before 3pm by Cab that is being arranged to start chemical dependency treatment program.  Other resources below noted if needed.          Coordinators from [facility] ([facility]) will be calling you in the next 24-48 hours to ensure that you have the resources you need. For additonal need of mental health services, you can also contact [facility] coordinators directly at [phone].    Recommendations:    1) Follow up with [facility] or other [facility] treatment programs  2) Follow up with mental health therapy    If I am feeling unsafe or I am in a crisis, I will:  - Contact my established care providers   - Call the National Suicide Prevention Lifeline: 645.313.7786   - [place] CRISIS TEXT Line 913659  - Go to the nearest emergency room   - Call 831 or 887 or 211    Below is a list of FREE Mental Health Options in the [facility]:  [facility]  [phone]  Serves those in need of free outpatient mental health care  Hours: Mon, Wed, Fri 1-3pm; Mon-Thurs 6:30-8:30pm    [facility]  [address] [place] [id number]  [phone]    Peer Support  Call the CEE Helpline at  [phone]  Or text \"HelpLine\" to 01336    Additional Information  Today you were seen by a licensed mental health professional through Triage and Transition services, [facility] ([facility])  for a crisis assessment in the Emergency Department at [facility].  It is recommended that you follow up with your established providers (psychiatrist, mental health therapist, and/or primary care doctor - as relevant) as soon as possible.     Coordinators from [facility] will be calling you in the next 24-48 hours to ensure that you have the resources you need.  You can also contact [facility] coordinators directly at [phone]. You may have been scheduled for or offered an appointment with a mental health provider. [facility] maintains an extensive network of licensed " Patient advised dr. Weinberg will see her at 9 am next Thursday at the [facility].    behavioral health providers to connect patients with the services they need.  We do not charge providers a fee to participate in our referral network.  We match patients with providers based on a patient's specific needs, insurance coverage, and location.  Our first effort will be to refer you to a provider within your care system, and will utilize providers outside your care system as needed.

## 2024-05-03 NOTE — PROGRESS NOTES
"Called and spoke with pt per conversation with pv's, updated pt that previous referrals to [facility]) and [facility] have been faxed, inquired about pt's interest in [facility] for an additional referral, pt reported she was amenable to this, sending additional referral for IP [place]ARA Tx to [facility]  [address] [place] [address]  [facility] Waitlist: [phone]  [facility] Admissions: [phone]    Addendum 5/3/24 1020:  Called and spoke with [facility] staff confirming receipt of referral faxed yesterday, reports the referral is currently in line to be review, staff reports [facility] is currently full until 5/8/24.  Called and spoke with [facility] staff reporting their residential admissions coordinator does not work on Fridays, staff reports \"it usually takes about a week\" and that they do not take admissions on Fridays, so intake timeline would be sometime next week at the earliest.    RAYNE Pa, [facility]  Substance Use Disorder Evaluation Counselor  Email: [email or web address]   "
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
## 2024-05-03 NOTE — TELEPHONE ENCOUNTER
LP SCREEN TELEPHONE NOTE   Alejandrina Clement paperwork was reviewed by CANDIE Nash and the patient was deemed ELIGIBLE for the LP program.     Inpatient or Community Referral: Inpatient referral     Medical Screening (As Needed): The patient's medical and COVID-19 screen with the LP RN is pending at this time and NEEDS to be completed prior to placing this patient on the LP waiting list.     Regular use of benzodiazapine's (other than detox): The patient does NOT use benzodiazepines.     Insurance: The [facility] Department is responsible for obtaining ALL authorizations for treatment services at the EOP, DOP and LP levels of care.      This will be a: Seen by a [place] Evaluation Counselor: RAHEEM, ISP & LP UPDATE NOTE evaluation. (Update SBAR and route DAANES and DONALD's)     IV use or Pregnant: This patient is not pregnant or an IV drug user.     Business office: [phone]     List: This patient CAN NOT be placed on the [facility] Waiting List until after being medically approved for the LP program by the LP RN.       Group: Women's Group or Mixed Group     Additional Info as needed: NA     The best current contact telephone number for the patient is: [place] @ [phone] or @ [phone] if discharged to a shelter

## 2024-05-04 ENCOUNTER — HOSPITAL ENCOUNTER (OUTPATIENT)
Dept: BEHAVIORAL HEALTH | Facility: CLINIC | Age: 59
Discharge: HOME | End: 2024-05-04
Attending: FAMILY MEDICINE
Payer: COMMERCIAL

## 2024-05-04 PROCEDURE — H2035 A/D TX PROGRAM, PER HOUR: HCPCS | Mod: HQ

## 2024-05-04 PROCEDURE — 1002N00001 HC LODGING PLUS FACILITY CHARGE ADULT

## 2024-05-04 NOTE — GROUP NOTE
Group Therapy Documentation    PATIENT'S NAME: Alejandrina Clement  MRN:   [id number]  :   1965  ACCT. NUMBER: [id number]  DATE OF SERVICE: 24  START TIME: 12:30 PM  END TIME:  2:30 PM  FACILITATOR(S): Fanny Sanford LADC  TOPIC: BEH Group Therapy  Number of patients attending the group:  8  Group Length:  2 Hours    Group Therapy Type: Recovery strategies    Summary of Group / Topics Discussed:    Recovery Principles    Patients received a lecture addressing Communication Skills and Relapse Warning Warning Signs.      Group Attendance:  Attended group session    Patient's response to the group topic/interactions:  cooperative with task    Patient appeared to be Actively participating.        Client specific details:  Alejandrina Attended small group therapy. Patient engaged in discussion and participated in sharing feedback to their peers.   .

## 2024-05-04 NOTE — GROUP NOTE
"Group Therapy Documentation    PATIENT'S NAME: Alejandrina Clement  MRN:   [id number]  :   1965  ACCT. NUMBER: [id number]  DATE OF SERVICE: 24  START TIME:  9:00 AM  END TIME: 11:00 AM  FACILITATOR(S): Fanny Sanford LADC  TOPIC: BEH Group Therapy  Number of patients attending the group:  8  Group Length:  2 Hours    Group Therapy Type: Recovery strategies    Summary of Group / Topics Discussed:    Recovery Principles and Relapse prevention    Primary Counselor Nate Ryder gave Psychoeducational lecture on Relapse Prevention Skills/Essential Parts of  Stable Recovery Foundation.   Lecture included: Breakdown of \"Substance Use Disorder, Reasoning/Effectiveness behind the 12 Steps of AA, Willingness to Surrender, Continuing to Progress.Heal In Recovery, Sponsorship, Cross Addiction, Honesty with Self/Others, and Living Life on Life's Terms in Recovery.\"      Group Attendance:  Attended group session    Patient's response to the group topic/interactions:  cooperative with task    Patient appeared to be Actively participating.        Client specific details:  Alejandrina Attended small group therapy. Patient engaged in discussion and participated in sharing feedback to their peers.     .    "

## 2024-05-05 ENCOUNTER — HOSPITAL ENCOUNTER (OUTPATIENT)
Dept: BEHAVIORAL HEALTH | Facility: CLINIC | Age: 59
Discharge: HOME | End: 2024-05-05
Attending: FAMILY MEDICINE
Payer: COMMERCIAL

## 2024-05-05 PROCEDURE — H2035 A/D TX PROGRAM, PER HOUR: HCPCS | Mod: HQ

## 2024-05-05 PROCEDURE — 1002N00001 HC LODGING PLUS FACILITY CHARGE ADULT

## 2024-05-05 NOTE — GROUP NOTE
Psychoeducation Group Documentation    PATIENT'S NAME: Alejandrina Clement  MRN:   [id number]  :   1965  ACCT. NUMBER: [id number]  DATE OF SERVICE: 24  START TIME: 12:30 PM  END TIME:  1:30 PM  FACILITATOR(S): Thomas Ryder LADC; Dinorah Bryant LADC  TOPIC: BEH Pyschoeducation  Number of patients attending the group:  28  Group Length:  1 Hours    Skills Group Therapy Type: Emotion regulation skills and Relationship skills development    Summary of Group / Topics Discussed:    Relationship/social skills and Balanced lifestyle skills        Group Attendance:  Attended group session    Patient's response to the group topic/interactions:  cooperative with task    Patient appeared to be Attentive.         Client specific details:  Alejandrina gave appropriate feedback. .

## 2024-05-05 NOTE — GROUP NOTE
Group Therapy Documentation    PATIENT'S NAME: Alejandrina Clement  MRN:   [id number]  :   1965  ACCT. NUMBER: [id number]  DATE OF SERVICE: 24  START TIME:  9:00 AM  END TIME: 11:00 AM  FACILITATOR(S): Dinoarh Bryant LADC; Thomas Ryder LADC  TOPIC: BEH Group Therapy  Number of patients attending the group:  28  Group Length:  2 Hours    Group Therapy Type: Recovery strategies    Summary of Group / Topics Discussed:    Recovery Principles and Relapse prevention    Group Attendance:  Attended group session    Patient's response to the group topic/interactions:  cooperative with task    Patient appeared to be Attentive and Engaged.        Client specific details: Pt listened respectfully to a presentation on relapse prevention and asked appropriate questions.

## 2024-05-05 NOTE — ADDENDUM NOTE
Encounter addended by: Dinorah Bryant LADC on: 5/5/2024 9:37 AM   Actions taken: Clinical Note Signed

## 2024-05-05 NOTE — GROUP NOTE
Psychoeducation Group Documentation    PATIENT'S NAME: Alejandrina Clement  MRN:   [id number]  :   1965  ACCT. NUMBER: [id number]  DATE OF SERVICE: 24  START TIME:  8:40 AM  END TIME: 10:30 AM  FACILITATOR(S): Thomas Ryder LADC; Theresa Wallace RN; Dinorah Bryant LADC  TOPIC: BEH Pyschoeducation  Number of patients attending the group:  28  Group Length:  2 Hours    Skills Group Therapy Type: Healthy behaviors development    Summary of Group / Topics Discussed:    Balanced lifestyle skills        Group Attendance:  Attended group session    Patient's response to the group topic/interactions:  cooperative with task and listened actively    Patient appeared to be Attentive.         Client specific details:  Alejandrina gave appropriate feedback. .

## 2024-05-05 NOTE — ADDENDUM NOTE
Encounter addended by: Dinorah Bryant LADC on: 5/5/2024 10:18 AM   Actions taken: Clinical Note Signed

## 2024-05-05 NOTE — GROUP NOTE
Group Therapy Documentation    PATIENT'S NAME: Alejandrina Clement  MRN:   [id number]  :   1965  ACCT. NUMBER: [id number]  DATE OF SERVICE: 24  START TIME: 12:30 PM  END TIME:  2:30 PM  FACILITATOR(S): Dinorah Bryant LADC; Thomas Ryder LADC; Fanny Sanford LADC  TOPIC: BEH Group Therapy  Number of patients attending the group:  27  Group Length:  2 Hours    Group Therapy Type: Recovery strategies    Summary of Group / Topics Discussed:    Relationship/socialization, Balanced lifestyle, and Relapse prevention    Group Attendance:  Attended group session    Patient's response to the group topic/interactions:  cooperative with task    Patient appeared to be Attentive and Engaged.        Client specific details: Pt listened respectfully to a presentation on communication types and skills, and took part in the related activities.

## 2024-05-06 ENCOUNTER — TELEPHONE (OUTPATIENT)
Dept: BEHAVIORAL HEALTH | Facility: CLINIC | Age: 59
End: 2024-05-06
Payer: COMMERCIAL

## 2024-05-06 ENCOUNTER — HOSPITAL ENCOUNTER (OUTPATIENT)
Dept: BEHAVIORAL HEALTH | Facility: CLINIC | Age: 59
Discharge: HOME | End: 2024-05-06
Attending: FAMILY MEDICINE
Payer: COMMERCIAL

## 2024-05-06 DIAGNOSIS — F32.9 MAJOR DEPRESSIVE DISORDER WITH CURRENT ACTIVE EPISODE, UNSPECIFIED DEPRESSION EPISODE SEVERITY, UNSPECIFIED WHETHER RECURRENT: Primary | ICD-10-CM

## 2024-05-06 DIAGNOSIS — F51.01 PRIMARY INSOMNIA: ICD-10-CM

## 2024-05-06 PROBLEM — F19.20 CHEMICAL DEPENDENCY (H): Status: ACTIVE | Noted: 2024-05-06

## 2024-05-06 PROCEDURE — H2035 A/D TX PROGRAM, PER HOUR: HCPCS | Mod: HQ

## 2024-05-06 PROCEDURE — 1002N00001 HC LODGING PLUS FACILITY CHARGE ADULT

## 2024-05-06 RX ORDER — QUETIAPINE FUMARATE 25 MG/1
25 TABLET, FILM COATED ORAL
Qty: 30 TABLET | Refills: 0 | Status: SHIPPED | OUTPATIENT
Start: 2024-05-06 | End: 2024-05-07

## 2024-05-06 RX ORDER — ESCITALOPRAM OXALATE 10 MG/1
10 TABLET ORAL DAILY
Qty: 30 TABLET | Refills: 0 | Status: SHIPPED | OUTPATIENT
Start: 2024-05-06 | End: 2024-05-07

## 2024-05-06 NOTE — TELEPHONE ENCOUNTER
1. Major depressive disorder with current active episode, unspecified depression episode severity, unspecified whether recurrent    - escitalopram (LEXAPRO) 10 MG tablet; Take 1 tablet (10 mg) by mouth daily  Dispense: 30 tablet; Refill: 0    2. Primary insomnia    - QUEtiapine (SEROQUEL) 25 MG tablet; Take 1 tablet (25 mg) by mouth nightly as needed (insomnia)  Dispense: 30 tablet; Refill: 0

## 2024-05-06 NOTE — GROUP NOTE
Group Therapy Documentation    PATIENT'S NAME: Alejandrina Clement  MRN:   [id number]  :   1965  ACCT. NUMBER: [id number]  DATE OF SERVICE: 24  START TIME: 12:30 PM  END TIME:  2:30 PM  FACILITATOR(S): Anthony Wooten LADC  TOPIC: BEH Group Therapy  Number of patients attending the group:  8  Group Length:  2 Hours    Group Therapy Type: Recovery strategies    Summary of Group / Topics Discussed:    Recovery Principles, Mindfulness/Relaxation, Coping/DBT informed care, Trauma informed care, Disease of addiction, and Emotions/expression      Group Attendance:  Attended group session    Patient's response to the group topic/interactions:  cooperative with task    Patient appeared to be Attentive and Engaged.        Client specific details:  Alejandrina took part in afternoon group. She took part in the reading and discussion on manifesting and saying out loud things that you would like to have happen. She listened to and gave positive feedback on her peer's assignment. For the second half of group she took part in the graduation of her peer, and a grounding technique activity.

## 2024-05-06 NOTE — PROGRESS NOTES
Name: Alejandrina Clement  Date: 5/6/2024  Medical Record: [id number]    Envelope Number: [id number]    List of Contents (List each item separately in new row):   Mirtazapine 15mg Tabs    Admission:  I am responsible for any personal items that are not sent to the safe or pharmacy.  [facility] is not responsible for loss, theft or damage of any property in my possession.      Patient Signature:  ___________________________________________       Date/Time:__________________________    Staff Signature: __________________________________       Date/Time:__________________________    [facility] Staff person, if patient is unable/unwilling to sign:      __________________________________________________________       Date/Time: __________________________      **All medications are packaged by [facility] staff and securely stored on [facility]. Medications left by patients at discharge will be packaged by [facility] staff and transported by [facility] staff to inpatient pharmacy for storage.**        Discharge:  [facility] has returned all of my personal belongings:    Patient Signature: ________________________________________     Date/Time: ____________________________________    Staff Signature: ______________________________________     Date/Time:_____________________________________

## 2024-05-06 NOTE — TELEPHONE ENCOUNTER
Hi Dr Negrete,    Pt was seen by Dr Shaquille Busby, Psychiatry while on [facility].  Note dated 5/3/24.  See excerpt of EPIC note below:   Pt took Remeron 15mg on 5/3, 5/4 & 5/5.  She stated that is kind of worked at first, but is now waking up 3 times at night, starting at 1am and does not feel like she is getting restorative sleep.  Pt is wanting to change to the Seroquel/ Lexapro as suggested by Dr Busby (in EPIC note), since Remeron is not being tolerated as desired by pt.   I need to set pt up for Psychiatry, per pt request....but have not had a chance yet as she just arrived to  on 5/3/24.     Please advise    Theresa Wallace RN        DSM-5 Diagnosis:   296.22 (F32.1)  Major Depressive Disorder, Single Episode, Moderate With anxious distress  R/O PTSD   Alcohol and cocaine use disorders            Assessment:   She does seem very motivated to regain her sobriety, and will need some help with housing after this (returning to the [facility] is not an option; he nephew is picking up her possessions).   She should be treated for her depression and sleep difficulties. She has tried melatonin and trazodone in the past, but they didn't help  It appears that her best option for CD treatment will be [facility], but they are still working on admission. She obviously needs a residential treatment.            Summary of Recommendations:   I would try Remeron (mirtazepine) 15 mg HS. She was warned of morning grogginess for a few days after starting.   If the doesn't tolerate Remeron she could be switched to Lexapro 10 mg per day and trying Seroquel starting at 25 mg HS for sleep     Contact me or re-consult psychiatry as needed (psychiatry is signing off).      Shaquille Busby M.D.   Consult liaison psychiatry   [facility]   Securely message with [email or web address] (more info)  Text page via [facility] Paging/Directory  If I am not available, then P intake ([phone]) should know who   Is  on call

## 2024-05-06 NOTE — TELEPHONE ENCOUNTER
----- Message from CANDIE Shaw sent at 5/3/2024  4:29 PM CDT -----  Regarding: Admission  Pt arrived and admitted to [facility].

## 2024-05-06 NOTE — PROGRESS NOTES
Comprehensive Assessment Summary     Based on client interview, review of previous assessments and   comprehensive assessment interview the following diagnosis and recommendations are:     Patient: Alejandrina Clement  MRN; [id number]   : 1965  Age: 59 year old Sex: female       Client meets criteria for:  Alcohol Use Disorder Severe F10.20  Stimulant Use Disorder Severe F14.20  Cannabis Use Disorder Mild F12.20    Dimension One: Acute Intoxication/Withdrawal Potential     Ratin    (Consider the client's ability to cope with withdrawal symptoms and current state of intoxication)     Patient reports DOC of alcohol, cocaine and marijuana. Patient reports last date of use was on 2024. Patient denies current withdrawal symptoms. Patient referred from the [facility] emergency department.    Dimension Two: Biomedical Condition and Complications    Ratin  (Consider the degree to which any physical disorder would interfere with treatment for substance abuse, and the client's ability to tolerate any related discomfort; determine the impact of continued chemical use on the unborn child if the client is pregnant)     Patient reports having a home clinic/MD at [facility]. Patient reports having a distant history of pituitary tumor. Patient reports not taking any current medications prior to her ED occurrence. Patient reports no other chronic biomedical condition that would hinder her from attending daily programming.      Dimension Three: Emotional/Behavioral/Cognitive Conditions & Complications    Ratin  (Determine the degree to which any condition or complications are likely to interfere with treatment for substance abuse or with functioning in significant life areas and the likelihood of risk of harm to self or others)     Patient reports no formal mental health diagnosis. Patient reports mental health symptoms of anxiety and depression. Patient reports significant emotional and financial  abuse. Patient reports struggling from guilt and shame; having remorse about her relapsing. Upon admission, patient was assessed for ESTEPHANIE-7 scoring 13 out of 21 indicating moderate anxiety; PHQ-9 scoring 15 out of 27 indicating moderately severe depression; and CSSRS scoring 2 indicating low/moderate risk for suicide.Patient is willing to meet with in-house therapist.  Patient reports significant SI upon her ED admission. Patient reports SI attempt 2 years ago and started to overdose on percocet. Patient reports no history of MH hospitalization. Patient will be set up for addiction medicine consult and diagnostic assessment.     Dimension Four: Treatment Acceptance/Resistance     Ratin  (Consider the amount of support and encouragement necessary to keep the client involved in treatment)     Patient reports a distant history of alcohol and drug abuse has been sober for 8 years until recent relapse. Patient expressed concerns that if she would be discharged from the ED then, she would return to use. Patient reports last treatment for KYARA was 25-30 years ago at [facility]. Patient reports willingness to comply with program schedule and rules. Patient reports motivation for treatment is to move forward with getting treatment for her mental health and sobriety.        Dimension Five: Continued Use/Relaspe Prevention     Ratin   (Consider the degree to which the client's recognizes relapse issues and has the skills to prevent relapse of either substance use or mental health problems)     Patient reports relapsing due to her recent lost of home and had to move her mother into a nursing home. Patient reports using alcohol and nasal and smoking cocaine over the past 3 days prior to ED occurrence. Patient is at high risk for relapse due to recent use, craving and lack of impulse control. Patient needs improvement for her long-term sober coping skills and increase awareness in to her own relapse cues and triggers.  "    Dimension Six: Recovery Environment     Ratin   (Consider the degree to which key areas of the client's life are supportive of or antagonistic to treatment participation and recovery)     Patient reports homelessness and living at a motel which she reports is \"drug infested and filled with addicts\". Patient reports employment at [employer] and that her substance use has negatively impacted her ability to function at a work setting. Patient reports that since her father passed 12 years ago, her family has been distant and doesn't feel supported by them during the current relapse. Patient reports a significant cousin who is very supportive and sober. Patient reports no current legal issues.     I have reviewed the information on the assessment, psychosocial and medical history and checklist:        it is current. Dimension 1 is changed from 1 to 0, hence patient is stable is not currently experiencing severe withdrawal symptoms.     "

## 2024-05-06 NOTE — GROUP NOTE
Group Therapy Documentation    PATIENT'S NAME: Alejandrina Clement  MRN:   [id number]  :   1965  ACCT. NUMBER: [id number]  DATE OF SERVICE: 24  START TIME:  9:00 AM  END TIME: 11:00 AM  FACILITATOR(S): Carolina Lee LADC  TOPIC: BEH Group Therapy  Number of patients attending the group:  8    Group Length:  2 Hours    Group Therapy Type: Recovery strategies, Emotion processing, and Daily living/independence skills    Summary of Group / Topics Discussed:    Recovery Principles, Sober coping skills, Relationship/socialization, and Relapse prevention      Group Attendance:  Attended group session    Patient's response to the group topic/interactions:  cooperative with task    Patient appeared to be Actively participating, Attentive, and Engaged.        Client specific details:  Patient attended group session and was attentive and participative.

## 2024-05-07 ENCOUNTER — HOSPITAL ENCOUNTER (OUTPATIENT)
Dept: BEHAVIORAL HEALTH | Facility: CLINIC | Age: 59
Discharge: HOME | End: 2024-05-07
Attending: FAMILY MEDICINE
Payer: COMMERCIAL

## 2024-05-07 ENCOUNTER — LAB (OUTPATIENT)
Dept: LAB | Facility: CLINIC | Age: 59
End: 2024-05-07
Payer: COMMERCIAL

## 2024-05-07 ENCOUNTER — OFFICE VISIT (OUTPATIENT)
Dept: ADDICTION MEDICINE | Facility: CLINIC | Age: 59
End: 2024-05-07
Attending: FAMILY MEDICINE
Payer: COMMERCIAL

## 2024-05-07 VITALS — HEART RATE: 80 BPM | SYSTOLIC BLOOD PRESSURE: 102 MMHG | DIASTOLIC BLOOD PRESSURE: 69 MMHG

## 2024-05-07 DIAGNOSIS — F14.20 COCAINE USE DISORDER, SEVERE, DEPENDENCE (H): ICD-10-CM

## 2024-05-07 DIAGNOSIS — B19.20 HEPATITIS C VIRUS INFECTION WITHOUT HEPATIC COMA, UNSPECIFIED CHRONICITY: ICD-10-CM

## 2024-05-07 DIAGNOSIS — F10.20 ALCOHOL USE DISORDER, SEVERE, DEPENDENCE (H): Primary | ICD-10-CM

## 2024-05-07 DIAGNOSIS — F51.01 PRIMARY INSOMNIA: ICD-10-CM

## 2024-05-07 DIAGNOSIS — F32.9 MAJOR DEPRESSIVE DISORDER WITH CURRENT ACTIVE EPISODE, UNSPECIFIED DEPRESSION EPISODE SEVERITY, UNSPECIFIED WHETHER RECURRENT: ICD-10-CM

## 2024-05-07 DIAGNOSIS — Z11.3 SCREEN FOR STD (SEXUALLY TRANSMITTED DISEASE): ICD-10-CM

## 2024-05-07 LAB
HAV IGM SERPL QL IA: NONREACTIVE
HBV CORE AB SERPL QL IA: NONREACTIVE
HBV SURFACE AB SERPL IA-ACNC: <3.5 M[IU]/ML
HBV SURFACE AB SERPL IA-ACNC: NONREACTIVE M[IU]/ML
HBV SURFACE AG SERPL QL IA: NONREACTIVE
HCV AB SERPL QL IA: REACTIVE
HIV 1+2 AB+HIV1 P24 AG SERPL QL IA: NONREACTIVE
T PALLIDUM AB SER QL: NONREACTIVE

## 2024-05-07 PROCEDURE — G2211 COMPLEX E/M VISIT ADD ON: HCPCS | Performed by: NURSE PRACTITIONER

## 2024-05-07 PROCEDURE — 87340 HEPATITIS B SURFACE AG IA: CPT

## 2024-05-07 PROCEDURE — 36415 COLL VENOUS BLD VENIPUNCTURE: CPT

## 2024-05-07 PROCEDURE — 99000 SPECIMEN HANDLING OFFICE-LAB: CPT

## 2024-05-07 PROCEDURE — H2035 A/D TX PROGRAM, PER HOUR: HCPCS | Mod: HQ

## 2024-05-07 PROCEDURE — 87902 NFCT AGT GNTYP ALYS HEP C: CPT | Mod: 90

## 2024-05-07 PROCEDURE — 87491 CHLMYD TRACH DNA AMP PROBE: CPT | Performed by: NURSE PRACTITIONER

## 2024-05-07 PROCEDURE — 86706 HEP B SURFACE ANTIBODY: CPT

## 2024-05-07 PROCEDURE — 86780 TREPONEMA PALLIDUM: CPT

## 2024-05-07 PROCEDURE — 86709 HEPATITIS A IGM ANTIBODY: CPT

## 2024-05-07 PROCEDURE — 87522 HEPATITIS C REVRS TRNSCRPJ: CPT

## 2024-05-07 PROCEDURE — 87389 HIV-1 AG W/HIV-1&-2 AB AG IA: CPT

## 2024-05-07 PROCEDURE — 86704 HEP B CORE ANTIBODY TOTAL: CPT

## 2024-05-07 PROCEDURE — 86803 HEPATITIS C AB TEST: CPT

## 2024-05-07 PROCEDURE — H2035 A/D TX PROGRAM, PER HOUR: HCPCS

## 2024-05-07 PROCEDURE — 87591 N.GONORRHOEAE DNA AMP PROB: CPT | Performed by: NURSE PRACTITIONER

## 2024-05-07 PROCEDURE — 1002N00001 HC LODGING PLUS FACILITY CHARGE ADULT

## 2024-05-07 PROCEDURE — 99204 OFFICE O/P NEW MOD 45 MIN: CPT | Performed by: NURSE PRACTITIONER

## 2024-05-07 RX ORDER — ESCITALOPRAM OXALATE 10 MG/1
10 TABLET ORAL DAILY
Status: SHIPPED
Start: 2024-05-07 | End: 2024-05-24

## 2024-05-07 RX ORDER — QUETIAPINE FUMARATE 25 MG/1
25 TABLET, FILM COATED ORAL
Status: SHIPPED
Start: 2024-05-07 | End: 2024-05-13

## 2024-05-07 RX ORDER — NALTREXONE HYDROCHLORIDE 50 MG/1
TABLET, FILM COATED ORAL
Qty: 30 TABLET | Refills: 0 | Status: SHIPPED | OUTPATIENT
Start: 2024-05-07 | End: 2024-05-24

## 2024-05-07 NOTE — GROUP NOTE
Group Therapy Documentation    PATIENT'S NAME: Alejandrina Clement  MRN:   [id number]  :   1965  ACCT. NUMBER: [id number]  DATE OF SERVICE: 24  START TIME:  3:00 PM  END TIME:  4:00 PM  FACILITATOR(S): Fanny Sanford LADC; Carolina Lee LADC; Anthony Wooten LADC  TOPIC: BEH Group Therapy  Number of patients attending the group:  30  Group Length: 1 HR    Group Therapy Type: Recovery strategies    Summary of Group / Topics Discussed:    Recovery Principles    Guest Lecturer Dr. Velasquez presented a 60 minute presentation on Addiction Research. Exploring ( What is research? What's the point of research in the Addiction Field? What are the benefits? How long does it take? What is the overall goal of Addiction Study Research.) Patients were allowed to asked questions and process the presentation with peers and staff.        Group Attendance:  Attended group session    Patient's response to the group topic/interactions:  cooperative with task    Patient appeared to be Actively participating.        Client specific details:  Alejandrina attended afternoon skills group and participated in processing discussion. Patient remained engaged and participated throughout group session.       CANDIE Flores  .

## 2024-05-07 NOTE — PROGRESS NOTES
Acknowledgement of Current Treatment Plan - Initial Treatment Plan       INITIAL TREATMENT PLAN:     1. I have met and reviewed my treatment plan with my clinician. I was given my assignments that correlate with treatment plan strategies.    Name Signature/Date   Alejandrina Clement     1965    Name of Clinician   Signature/Date   HAILEY Glass, [facility]      2. I have completed and turned in my Safety Plan.  I am able to request a hard copy of this plan.  Patient signature/date:      _________________________________________________      3. Last Use Date: 4/29/2024  Patient signature/date:     __________________________________________________

## 2024-05-07 NOTE — GROUP NOTE
Group Therapy Documentation    PATIENT'S NAME: Alejandrina Clement  MRN:   [id number]  :   1965  ACCT. NUMBER: [id number]  DATE OF SERVICE: 24  START TIME:  9:00 AM  END TIME: 11:00 AM  FACILITATOR(S): Opal Becerril LADC  TOPIC: BEH Group Therapy  Number of patients attending the group:  7  Group Length:  2 Hours    Group Therapy Type: Recovery strategies, Emotion processing, and Daily living/independence skills    Summary of Group / Topics Discussed:    Recovery Principles, Relationship/socialization, Mindfulness/Relaxation, and Emotions/expression  Daily check-in, reflection reading and discussion, assignment presentation and feedbacks; desiderata poem by Isaiah Mcbride processing and discussion.       Group Attendance:  Attended group session    Patient's response to the group topic/interactions:  cooperative with task    Patient appeared to be Engaged.        Client specific details:  Patient engaged during first part of session. Patient excused during the second part for her addiction medicine appointment.

## 2024-05-07 NOTE — PROGRESS NOTES
Individual Treatment Plan     Adult Chemical Dependency Program  Treatment Plan Requirements    These services are provided by the facility for each patient/client according to the individual's treatment plan:  Individual and group counseling  Education  Transition services  Services to address any co-occurring mental illness  Service coordination    Initial Treatment Plan Goals:  Complete all the requirements of Program Orientation.  Maintain medication compliance throughout the program.  Complete requirements for workshop/skills groups based on identified issues on your problem list.  Complete the support group attendance feedback sheet weekly.  Gain family involvement in treatment process to address family issues from the problem list.  Attend and participate in all required groups per individual treatment plan.  Focus attention to individualized issues from the treatment plan.  Complete all requirements for UA's, alcohol screening tests and other testing.  Schedule a physical examination if recommended.    In addition to the above, complete all individual goals as specifically outlines on your treatment plan.    Criteria for discharge:  Patients/clients are discharged from the program following completion of the entire program including Phase I and II or acceptance of other post-treatment referrals such as nursing home house, or aftercare at other facilities.  Patients/clients may also be discharged for inappropriate behavior or chemical use.    Favorable Discharge - Patients/clients have completed agreed upon treatment goals, understand their diagnosis and appear motivated about the follow-up care.  Guarded Discharge - Patients/clients have demonstrated some understanding of their diagnosis and recovery process, and have completed some of their treatment goals.  This prognosis also includes patients/clients who have completed some treatment goals but have not made commitment to community support or follow through  with referrals.  Unfavorable Discharge - Patients/clients have not completed agreed upon treatment goals due to their own choice, have limited understanding of their diagnosis, and have shown minimal or inconsistent behavior conducive to recovery.  Those patients/clients discharged due to behavioral problems will also be unfavorable discharges.    Adult CD Treatment Plan                                Alejandrina Clement  [id number]  1965  59 year old   female  Acute Intoxication/Withdrawal Potential     DIMENSION 1  RISK FACTOR: 0     SUBSTANCE USE DISORDERS:  Alcohol Use Disorder Severe F10.20  Stimulant Use Disorder Severe F14.20  Cannabis Use Disorder Mild F12.20           Date Assigned Source Area of Treatment Focus / Goal / Treatment Strategies    Target  Date Initials Outcome Date Completed   5/7/2024 Self -  Current, History -  Current, and Assessment -  Current  Area of Treatment Focus:  Substance use, cravings and urges.  Last use date was reported as 4/29/2024.       Goal:  Develop effective strategies to maintain sobriety.   and Be able to manage mild to moderate withdrawal symptoms.    Treatment Strategies:  (Note: Must indicate the amount and frequency for each strategy)  I will report to counselor and group any alcohol or drug use  I will report to nurse any increase in withdrawal symptoms  3.  I will read and complete the PAWS packet.                   Ongoing                   KLB                   Eff                     5/31/24       Biomedical Conditions and Complaints     DIMENSION 2  RISK FACTOR: 1             Date Assigned Source Area of Treatment Focus / Goal / Treatment Strategies Target  Date Initials Outcome Date Completed   5/7/2024 Self -  Current, History -  Current, and Assessment -  Current   Area of Treatment Focus:   Patient reports having a distant history of pituitary tumor. Patient reports not taking any current medications prior to her ED occurrence.     Goal:  Follow  "recommendations of medical provider.  Improve and maintain overall physical health.     Treatment Strategies:  (Note: Must indicate the amount and frequency for each strategy)  I will continue to take prescribed medications  I will establish and follow through with medical interventions while in program.   3. I will follow through with my medication changes.  4. I will follow through with my appointment with addiction medicine                         Ongoing  Ongoing    Ongoing  5/31/24                     KLB                         Eff  Eff    Eff  Eff                            5/31/24 5/31/24 5/31/24 5/6/2024       Emotional/Behavioral/Cognitive Conditions and Complications     DIMENSION 3  RISK FACTOR: 2             Date Assigned Source Area of Treatment Focus / Goal / Treatment Strategies Target  Date Initials Outcome Date Completed     5/7/2024 Self -  Current, History -  Current, and Assessment -  Current   Area of Treatment Focus:   Patient reports no formal mental health diagnosis. Patient reports mental health symptoms of anxiety and depression.     Goal:  Become medication compliant.  Schedule a psychatric evaluation.  Improve self-esteem.  Understand the relationship between addiction and mental health issues.  Learn skills to express emotions in a healthy and appropriate way.    Treatment Strategies:  (Note: Must indicate the amount and frequency for each strategy)  I will set-up an appointment with psychiatrist.   I will meet with in-house therapist while in treatment and request referrals.  I will complete my \"Creating New Beliefs\" and share in group.   I will complete my Grief and Loss assignment and share in group.    I will complete daily Journaling. Journal prompts given.  I will complete my  Correcting Distorted Thinking  and share in group   I will be aware of warning signs and self-harm and report any new symptoms or increase in depression/ anxiety ASAP.  8. I will self-monitor and " "reassessed if change in risk ratings.                                         5/10/24    5/14/24    Ongoing    5/16/24    Ongoing      Ongoing                               KLB                                         Eff     Eff    Eff    EFF    Eff      Eff                                           5/27/24    5/23/24    5/31/24    5/15/24    5/31/24      5/31/24        Readiness to Change     DIMENSION 4  RISK FACTOR: 2             Date Assigned Source Area of Treatment Focus / Goal / Treatment Strategies Target  Date Initials Outcome Date Completed   5/7/2024 Self -  Current, History -  Current, and Assessment -  Current   Area of Treatment Focus: Patient has consequences to self and others due to continued use. Patient lacks internal/external motivation to stop using substances but has external factors which appears to motivate patient.    Goal: Understand the impact your substance use has had on you. Understand the impact your substance use has had on your family and significant relationships., Increase internal motivation., and connect with your spirituality.    Treatment Strategies:  I will complete the \" [facility] [facility] First Step\" assignment and present in group.  2. I will complete my \"Drug Use History\" assignment and present in group.   3. I will read and complete  Consequence of continuing addictive lifestyles  and share in group.  4. I will complete and share my \"Identifying Relapse Triggers and Cues\"    5. I will attend group programing, lectures, and skills groups on time and actively participate in my treatment program                           5/9/24 5/17/24 5/20/24 5/22/24    Ongoing                         KLB                           EFF     INC    EFF    Eff    Eff                             5/13/24 5/31/24 5/23/24 5/27/24 5/31/24        Relapse/Continues Use/Continues Problem Potential     DIMENSION 5  RISK FACTOR: 4                 Date Assigned Source Area of Treatment Focus " "/ Goal / Treatment Strategies Target  Date Initials Outcome Date Completed   5/7/2024 Self -  Current, History -  Current, and Assessment -  Current   Area of Treatment Focus:  Continued escalated use despite consequences. Patient reports their use has affected their relationships, financial stability, housing, employment, legal consequences, and mental health stability. Patient reports needing to gain prevention skills that are essential to remain sober   Goal:  Develop sober coping and living skills., Identify personal triggers and relapse warning signs., Identify what led to your last relapse., Identify and understand personal relapse and self-sabotage patterns., and Develop insight on how cross-addiction has impacted your life.  Treatment Strategies:  (Note: Must indicate the amount and frequency for each strategy)  I will complete \"Relapse Awareness and Prevention Plan\" and present to group.   2. I will complete a 5 v 5 collage - 5 years in sobriety v 5 years in use and share in group.  3. I will complete a list \"25 Behaviors and Activites Supportive of Recovery\" and share in group  4. I will participate in relapse prevention workshops and complete all activities.                                    5/31/24 5/31/24 5/29/24    Ongoing                                KLB                                   Eff    Eff    Eff    Eff                                   5/30/24 5/16/24 5/30/24 5/31/24       Recovery Environment     DIMENSION 6  RISK FACTOR: 4                 Date Assigned Source Area of Treatment Focus / Goal / Treatment Strategies Target  Date Initials Outcome Date Completed   5/7/2024 Self -  Current, History -  Current, and Assessment -  Current   Area of Treatment Focus:  Patient is homeless. Patient lacks sober supportive network.     Goal:  Develop a sober support network., Develop boundaries., and reflect on treatment experience, celebrate accomplishments, anticipate challenges in new " sober lifestyle and set goals.    Treatment Strategies:  (Note: Must indicate the amount and frequency for each strategy)  I will attend 12 step meetings while in treatment.  2. I will work with  staff and explore sober housing and develop an aftercare plan.  3. I will obtain a sponsor.  4. I will continue to develop relationships with same gendered peers in the program.  5. I will complete and present in group  Balancing Recovery, Family and Work .                           Ongoing            5/27/24                 KLB                           Eff            Eff                           5/31/24 5/28/24     Resources  Resources to which the patient is being referred for problems when problems are to be addressed concurrently by another provider: NA, and all other providers as identified throughout treatment.     All interventions that are designated as current will need to be completed in order to transition out of treatment with a favorable prognosis. The treatment plan is a flexible document and a work in progress. Interventions and goals may be added at any time to customize this plan to each individual's needs. Client may work with clinician to change interventions as long as they pertain to the goals stipulated in the plan and/or are clinically driven.    Opal Becerril, [facility]  May 7, 2024

## 2024-05-07 NOTE — PROGRESS NOTES
SUBJECTIVE                                                      Alejandrina Clement is a 59 year old female who presents for initial visit for addiction consultation and management referred by [facility] due to concerns for Alcohol Use Disorder (AUD) and Stimulant Use Disorder.     Visit performed In Person, face-to-face    HPI: Alejandrina Clement is a 59 year old female with history of alcohol and cocaine use who presents for further evaluation of possible substance use disorder and management options.    Reports alcohol use starting at age 14. Reports it became very problematic after she left home and went to college in [place], she completed one semester, feels she was not able to complete college due to her drinking at the time. Period of sustained remission from age [age] yo, was in care home at the time for 5 years and another 18 month sentence. long-term due to drug and alcohol use. Since her 30's she has been drinking, almost daily. Drinking 6 pk of beer and 1/2 pint hard alcohol per day. H/o of smoking cannabis and crack cocaine. Cocaine use was triggered by having cash. Prior to starting [facility] she as drinking beer daily, cocaine daily, and 1/2 pint ever 1-2 days. Last use 5/2/24. Denies current withdrawal symptoms. No h/o alcohol withdrawal seizures or DT's. Has been to detox once. H/o pituitary tumor originally found in 2000. PCP at [facility]. Started on mirtazapine from ED psych consult. Was not working. Dr Negrete started lexapro 10 mg daily and Seroquel 25 mg po at hs for insomnia which has improved symptoms. Modo stable. No SI. H/o Hep C, has not received treatment.     A problematic pattern of substance use leading to clinically significant impairment or distress, as manifested by at least 2 of the following, occurring within a 12-month period:  Alcohol is often taken in larger amounts or over a longer period than was intended.- YES met for alcohol and cocaine   There is a persistent desire or unsuccessful efforts to cut down  or control alcohol use. - YES met for alcohol and cocaine   A great deal of time is spent in activities necessary to obtain alcohol, use alcohol, or recover from its effects. - YES met for alcohol and cocaine   Craving, or a strong desire or urge to use alcohol. - YES met for alcohol and cocaine   Recurrent alcohol use resulting in a failure to fulfill major role obligations at work, school, or home. - YES met for alcohol and cocaine   Continued alcohol use despite having persistent or recurrent social or interpersonal problems caused or exacerbated by the effects of alcohol. - YES met for alcohol and cocaine   Important social, occupational, or recreational activities are given up or reduced because of alcohol use.- YES met for alcohol and cocaine   Recurrent alcohol use in situations in which it is physically hazardous. - YES met for alcohol and cocaine   Alcohol use is continued despite knowledge of having a persistent or recurrent physical or psychological problem that is likely to have been caused or exacerbated by alcohol. - YES met for alcohol and cocaine   Tolerance- YES  Withdrawal - YES    303.9 (F10.2) Severe: Presence of 6 or more symptoms.     Substance Use History:   ALCOHOL - last use 04/29/2024, drinking 6 pk beer and 1/2 pint hard alcohol per day   CANNABIS - Yes  PRESCRIPTION STIMULANTS (includes Ritalin, Adderall, Vyvanse) - Denies  COCAINE/CRACK - last use 04/29/2024, use varied based on how much she could afford   METH/AMPHETAMINES (includes ecstasy, MDMA/ryan) - Denies  OPIATES - Denies  BENZODIAZEPINES (includes Ativan, Klonopin, Xanax) - Denies  KRATOM (mild opioid and stimulant effects) - denies  KETAMINE - Denies  HALLUCINOGENS (includes DXM) - Denies  BEHAVIORAL (Gambling, Eating d/o, Compulsivity) - Denies  History of treatment - none  NICOTINE  Cigarettes: yes  Chew/snus: none  Vaping: none  Past NRT/medication use: none      Previous withdrawal treatment episodes (e.g. detox): once  "about 25 yeas ago  Previous [facility] treatment programs: 4 times   Hospitalizations or overdose: None  Medical complications from substance use: None  IV Drug use?: Yes pt reports trying it one time  Previous Medication for Addiction Tx: No  Longest period of full abstinence: 8 years  Activities that have previously supported abstinence: Confucianism, AA meetings, working and staying active  Current Recovery Activities: Pt in treatment, reading again and going to AA meetings       Infectious disease screening  Hep C: Genotype 1 A from 6/24/2021, HCV RNA 5,493 High  on 6/24/21 No results found for: \"HCVAB\"    HIV: non reactive on 6/19/2023 No results found for: \"HIAGAB\"      Pregnancy Status  LMP: postmenopausal     Psychiatric History (per patient report and problem list review)  Past diagnoses - anxiety and depression   Current or past psychiatrist: yes  Current or past therapist:  none  Hospitalizations/TMS/ECT - no  Suicide Attempts - yes once  Medication trials - none      PHQ-9 scores:      5/3/2024     3:00 PM   PHQ   PHQ-9 Total Score 15   Q9: Thoughts of better off dead/self-harm past 2 weeks Several days     ESTEPHANIE-7 scores:      5/3/2024     3:00 PM   ESTEPHANIE-7 SCORE   Total Score 13       SOCIAL HISTORY:  Housing status:  homeless  Employment status: Unemployed, seeking work  Relationship status: Single  Children: 0  Legal concerns related to use: none  Contact information up to date? yes    3rd Party Involvement not today (please obtain DONALD if pt would like to include)      Medical History:    Patient Active Problem List    Diagnosis Date Noted    Chemical dependency (H) 05/06/2024     Priority: Medium    Adjustment disorder with mixed anxiety and depressed mood 05/02/2024     Priority: Medium    Nondependent alcohol abuse, episodic drinking behavior 05/02/2024     Priority: Medium    Nondependent cocaine abuse, episodic (H) 05/02/2024     Priority: Medium    Pituitary tumor 05/02/2024     Priority: Medium    Cocaine use " 05/02/2024     Priority: Medium    Alcohol use disorder 05/02/2024     Priority: Medium    Upper Back Pain (Between Shoulder Blades)      Priority: Medium     Created by Conversion        Trichomoniasis      Priority: Medium     Created by Conversion        Chest Pain      Priority: Medium     Created by Conversion           Past Medical History:   Diagnosis Date    Alteration in appetite     Benign tumor of brain (H)     Chronic cough     Constipation     Depression     Dizziness     Hypertension     Numbness and tingling     Weight loss        No past surgical history on file.      No family history on file.    Current Outpatient Medications   Medication Sig Dispense Refill    acetaminophen (TYLENOL) 500 MG tablet Take 500-1,000 mg by mouth every 8 hours as needed for mild pain      alum & mag hydroxide-simethicone (MAALOX) 200-200-20 MG/5ML SUSP suspension Take 30 mLs by mouth every 6 hours as needed for indigestion      benzocaine-menthol (CEPACOL) 15-3.6 MG lozenge Place 1 lozenge inside cheek every 2 hours as needed for sore throat      escitalopram (LEXAPRO) 10 MG tablet Take 1 tablet (10 mg) by mouth daily      ibuprofen (ADVIL/MOTRIN) 200 MG tablet Take 600 mg by mouth every 6 hours as needed for pain      loratadine (CLARITIN) 10 MG tablet Take 10 mg by mouth daily      naltrexone (DEPADE/REVIA) 50 MG tablet Take 1/2 tablet for 1 week, then increase to 1 tablet daily 30 tablet 0    QUEtiapine (SEROQUEL) 25 MG tablet Take 1 tablet (25 mg) by mouth nightly as needed (insomnia)      senna-docusate (SENOKOT-S/PERICOLACE) 8.6-50 MG tablet Take 2 tablets by mouth daily as needed for constipation      guaiFENesin-dextromethorphan (ROBITUSSIN DM) 100-10 MG/5ML syrup Take 10 mLs by mouth 4 times daily as needed for cough (Patient not taking: Reported on 5/7/2024)      melatonin 5 MG tablet Take 5 mg by mouth nightly as needed for sleep (Patient not taking: Reported on 5/7/2024)       Allergies   Allergen Reactions  "   Vicodin [Hydrocodone-Acetaminophen]        OBJECTIVE                                                      EXAM    /69 (BP Location: Left arm, Patient Position: Sitting, Cuff Size: Adult Regular)   Pulse 80     GENERAL: healthy, alert and no distress  EYES: Eyes grossly normal to inspection, PERRL and conjunctivae and sclerae normal  RESP: No respiratory distress  MENTAL STATUS EXAM  Appearance/Behavior: No appearant distress and Neatly groomed  Speech: Normal  Mood/Affect: normal affect  Insight: Adequate      LAB  Recent [facility]DS Labs (may not contain today's lab data)  No results found for: \"BUP\", \"BZO\", \"BAR\", \"CHITRA\", \"MAMP\", \"AMP\", \"MDMA\", \"MTD\", \"EEQ861\", \"OXY\", \"PCP\", \"THC\", \"TEMP\", \"SGPOCT\"    Hepatic Function  AST   Date Value Ref Range Status   05/02/2024 56 (H) 0 - 45 U/L Final     Comment:     Reference intervals for this test were updated on 6/12/2023 to more accurately reflect our healthy population. There may be differences in the flagging of prior results with similar values performed with this method. Interpretation of those prior results can be made in the context of the updated reference intervals.   09/23/2015 29 0 - 45 U/L Final     ALT   Date Value Ref Range Status   05/02/2024 38 0 - 50 U/L Final     Comment:     Reference intervals for this test were updated on 6/12/2023 to more accurately reflect our healthy population. There may be differences in the flagging of prior results with similar values performed with this method. Interpretation of those prior results can be made in the context of the updated reference intervals.     09/23/2015 39 0 - 50 U/L Final     Bilirubin Total   Date Value Ref Range Status   05/02/2024 0.6 <=1.2 mg/dL Final   09/23/2015 1.1 0.2 - 1.3 mg/dL Final     Albumin   Date Value Ref Range Status   05/02/2024 4.3 3.5 - 5.2 g/dL Final   09/23/2015 3.8 3.4 - 5.0 g/dL Final       CBC  WBC   Date Value Ref Range Status   09/23/2015 8.7 4.0 - 11.0 10e9/L Final     WBC " Count   Date Value Ref Range Status   05/02/2024 6.5 4.0 - 11.0 10e3/uL Final     RBC Count   Date Value Ref Range Status   05/02/2024 4.40 3.80 - 5.20 10e6/uL Final   09/23/2015 4.93 3.8 - 5.2 10e12/L Final     Hemoglobin   Date Value Ref Range Status   05/02/2024 12.6 11.7 - 15.7 g/dL Final   09/23/2015 13.8 11.7 - 15.7 g/dL Final     Hematocrit   Date Value Ref Range Status   05/02/2024 38.2 35.0 - 47.0 % Final   09/23/2015 41.4 35.0 - 47.0 % Final     MCV   Date Value Ref Range Status   05/02/2024 87 78 - 100 fL Final   09/23/2015 84 78 - 100 fl Final     MCH   Date Value Ref Range Status   05/02/2024 28.6 26.5 - 33.0 pg Final   09/23/2015 28.0 26.5 - 33.0 pg Final     MCHC   Date Value Ref Range Status   05/02/2024 33.0 31.5 - 36.5 g/dL Final   09/23/2015 33.3 31.5 - 36.5 g/dL Final     Platelet Count   Date Value Ref Range Status   05/02/2024 256 150 - 450 10e3/uL Final   09/23/2015 243 150 - 450 10e9/L Final     RDW   Date Value Ref Range Status   05/02/2024 14.6 10.0 - 15.0 % Final   09/23/2015 13.2 10.0 - 15.0 % Final       Today's lab data  No results found for any visits on 05/07/24.    [facility] Board of Pharmacy Data Base Reviewed; Consistent with patient reports and Epic records.       A/P                                                      ASSESSMENT/PLAN:  1. Alcohol use disorder, severe, dependence (H)  - pt presents for initial visit referred from . Reviewed criteria met for AUD severe. Recommendation for psychosocial interventions in combination with pharmacotherapy. Reviewed Naltrexone, Campral and Antabuse. Plan to start Naltrexone. Titrate to 50 mg . Reviewed MOA and common SE. Monitor use/cravings   - Continue programming at    - CMP and CBC reviewed from 5/2/24.   - naltrexone (DEPADE/REVIA) 50 MG tablet; Take 1/2 tablet for 1 week, then increase to 1 tablet daily  Dispense: 30 tablet; Refill: 0    2. Cocaine use disorder, severe, dependence (H)  Reviewed criteria met for cocaine  use disorder severe. Denies current cravings. Monitor. Discuss pharmacotherapy at follow up if indicated.   - Continue programming at [facility]     3. Hepatitis C virus infection without hepatic coma, unspecified chronicity  - H/o of Hepatitis C, no h/o treatment. Chart review shows Genotype 1 A from 6/24/2021, HCV RNA 5,493 High  on 6/24/21. Will recheck today. Referral placed for Hepatology. Pt would like to proceed with treatment.   - CMP and CBC reviewed from 5/2/24.   - Hepatitis B surface antigen; Future  - Hepatitis B Surface Antibody; Future  - Hepatitis B core antibody; Future  - Hepatitis C Screen Reflex to HCV RNA Quant and Genotype; Future  - Hepatitis A antibody IgM; Future  - Adult GI  Referral - Consult Only; Future    4. Screen for STD (sexually transmitted disease)  Asymptomatic screening as below  - HIV Antigen Antibody Combo; Future  - Hepatitis B surface antigen; Future  - Hepatitis B Surface Antibody; Future  - Hepatitis B core antibody; Future  - Hepatitis C Screen Reflex to HCV RNA Quant and Genotype; Future  - Treponema Abs w Reflex to RPR and Titer; Future  - NEISSERIA GONORRHOEA PCR  - CHLAMYDIA TRACHOMATIS PCR    5. Primary insomnia  Improved. Continue Seroquel 25 mg po at hs.   Will establish care with psychiatry for long term management.   - QUEtiapine (SEROQUEL) 25 MG tablet; Take 1 tablet (25 mg) by mouth nightly as needed (insomnia)    6. Major depressive disorder with current active episode, unspecified depression episode severity, unspecified whether recurrent  Recently start Lexapro 10 mg daily, mood stable. Continue unchanged. Will establish care with psychiatry for long term management.   - escitalopram (LEXAPRO) 10 MG tablet; Take 1 tablet (10 mg) by mouth daily         Continued Complex Management  The longitudinal plan of care for Alcohol Use Disorder (AUD) and Cocaine Use Disorder was addressed during this visit. Due to the added complexity in care, I will continue to  support Alejandrina in the subsequent management and with ongoing continuity of care.      Counseled the patient on the importance of having a recovery program in addition to medication to manage recovery.  Components include avoiding isolating, having willingness to change, avoiding triggers and managing cravings. Encouraged having some type of sober network and practicing honesty with trusted support person(s). Encouraged other services such as counseling, 12 step or other self-help organizations.        RTC   3 weeks    RICARDO Morrell [facility]  [phone]

## 2024-05-07 NOTE — GROUP NOTE
Group Therapy Documentation    PATIENT'S NAME: Alejandrina Clement  MRN:   [id number]  :   1965  ACCT. NUMBER: [id number]  DATE OF SERVICE: 24  START TIME: 12:30 PM  END TIME:  2:30 PM  FACILITATOR(S): Fanny Sanford LADC  TOPIC: BEH Group Therapy  Number of patients attending the group:  9  Group Length:  2 Hours    Group Therapy Type: Recovery strategies    Summary of Group / Topics Discussed:    Spiritual Care      Spiritual Group Therapy consisted of Spiritual Care and addressing Principles that are important in recovery, and wellness of self. Patients and facilitators ([place] & CANDIE)  reviewed topics related to sense of self, identity, and relations to others within spirituality/relision/nonspiritual concepts.       Group Attendance:  Attended group session    Patient's response to the group topic/interactions:  cooperative with task    Patient appeared to be Actively participating.        Client specific details:  More Attended small group therapy. Patient engaged in discussion and participated in sharing feedback to their peers.   .
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
## 2024-05-07 NOTE — Clinical Note
Starting Naltrexone for AUD. Referral for Hepatology for HCV treatment. Wooled like to be scheduled with psychiatry for anxiety/depression and insomnia. - RICARDO Morrell CNP on 5/7/2024 at 11:56 AM

## 2024-05-08 ENCOUNTER — HOSPITAL ENCOUNTER (OUTPATIENT)
Dept: BEHAVIORAL HEALTH | Facility: CLINIC | Age: 59
Discharge: HOME | End: 2024-05-08
Attending: FAMILY MEDICINE
Payer: COMMERCIAL

## 2024-05-08 LAB
C TRACH DNA SPEC QL NAA+PROBE: NEGATIVE
N GONORRHOEA DNA SPEC QL NAA+PROBE: NEGATIVE

## 2024-05-08 PROCEDURE — 90791 PSYCH DIAGNOSTIC EVALUATION: CPT | Performed by: COUNSELOR

## 2024-05-08 PROCEDURE — H2035 A/D TX PROGRAM, PER HOUR: HCPCS | Mod: HQ

## 2024-05-08 PROCEDURE — 1002N00001 HC LODGING PLUS FACILITY CHARGE ADULT

## 2024-05-08 ASSESSMENT — ANXIETY QUESTIONNAIRES
GAD7 TOTAL SCORE: 18
5. BEING SO RESTLESS THAT IT IS HARD TO SIT STILL: SEVERAL DAYS
7. FEELING AFRAID AS IF SOMETHING AWFUL MIGHT HAPPEN: MORE THAN HALF THE DAYS
4. TROUBLE RELAXING: NEARLY EVERY DAY
1. FEELING NERVOUS, ANXIOUS, OR ON EDGE: NEARLY EVERY DAY
3. WORRYING TOO MUCH ABOUT DIFFERENT THINGS: NEARLY EVERY DAY
GAD7 TOTAL SCORE: 18
6. BECOMING EASILY ANNOYED OR IRRITABLE: NEARLY EVERY DAY
2. NOT BEING ABLE TO STOP OR CONTROL WORRYING: NEARLY EVERY DAY

## 2024-05-08 ASSESSMENT — PATIENT HEALTH QUESTIONNAIRE - PHQ9: SUM OF ALL RESPONSES TO PHQ QUESTIONS 1-9: 19

## 2024-05-08 NOTE — GROUP NOTE
Group Therapy Documentation    PATIENT'S NAME: Alejandrina Clement  MRN:   [id number]  :   1965  ACCT. NUMBER: [id number]  DATE OF SERVICE: 24  START TIME:  8:30 AM  END TIME:  9:30 AM  FACILITATOR(S): Jhon Natarajan LADC; Fanny Sanford LADC  TOPIC: BEH Group Therapy  Number of patients attending the group:  10  Group Length:  1 Hours    Group Therapy Type: Recovery strategies    Summary of Group / Topics Discussed:    Recovery Principles      Group Attendance:  Attended group session    Patient's response to the group topic/interactions:  cooperative with task    Patient appeared to be Actively participating.        Client specific details:  More participated and interacted appropriately with peers and staff in skills group. No triggers to use noted or discussed.

## 2024-05-08 NOTE — GROUP NOTE
"Group Therapy Documentation    PATIENT'S NAME: Alejandrina Clement  MRN:   [id number]  :   1965  ACCT. NUMBER: [id number]  DATE OF SERVICE: 24  START TIME: 12:30 PM  END TIME:  2:30 PM  FACILITATOR(S): Elizabeth Wall LADC; Fanny Sanford LADC; Opal Becerril LADC  TOPIC: BEH Group Therapy  Number of patients attending the group:  10  Group Length:  2 Hours    Group Therapy Type: Recovery strategies, Emotion processing, and Daily living/independence skills    Summary of Group / Topics Discussed:    Recovery Principles, Sober coping skills, Disease of addiction, Emotions/expression, and Self-care activities.        Patients engaged in a thorough discussion about  self-acceptance\", and daily questions.    \"What part of my story am I trying to accept?   \"What's the benefit of accepting yourself and your story?  \"How can acceptance help me to move forward?     Each patient shared their personal experiences, and how this can help with their process of  self-awareness, coping skills,and with life events and challenges.       Group Attendance:  Attended group session    Patient's response to the group topic/interactions:  cooperative with task and discussed personal experience with topic    Patient appeared to be Engaged.        Client specific details:  More, engaged in group discussion. She became vunerable during discussion and check-in. She stated being proud of herself and trying to accept being an addict.     "

## 2024-05-08 NOTE — PROGRESS NOTES
"    [facility]        PATIENT'S NAME: Alejandrina Clement  PREFERRED NAME: Alejandrina  PRONOUNS:   she/her    MRN: [id number]  : 1965  ADDRESS: [address]  [place] [address]  ACCT. NUMBER:  [id number]  DATE OF SERVICE: 24  START TIME: 12:36 PM  END TIME: 2:30 PM  PREFERRED PHONE: [phone]  May we leave a program related message: Yes  EMERGENCY CONTACT: was obtained for Sathish Clement (Cousin) PH: [phone] .  SERVICE MODALITY:  In-person    [place] ADULT Mental Health DIAGNOSTIC ASSESSMENT    Identifying Information:  Patient is a 59 year old,  and Black   individual.  Patient was referred for an assessment by [facility] .  Patient attended the session alone.    Chief Complaint:   The reason for seeking services at this time is: \" mood swings and anger, anxiety and is really depressed and reports that she would benefits from a medications adjustment \" Pt reports Sx of depression and anxiety, \"I've been depressed a lot\", excessive crying, \"mood swings up and down\". Pt reports a Hx of SI (most recently about 3 days ago, Hx of intent x1 via intentional overdose on Percocets, pt reports she has never attempted)  The problem(s) began at the age of 14. Patient reports going to a psychiatrist at age 17 but does not recall much from the experience    Social/Family History:  Patient reported they grew up in [place], [place].  They were raised by biological parents.  Parents stayed .. Patient reported that their childhood was okay.  Patient described their current relationships with family of origin as distant. Pt reports since her father passed about 12 years ago her family has been \"distant' and pt relayed she does not feel supported by them during current relapse.      The patient describes their cultural background as Racial or Ethnic Self-Identification \"\".  Cultural influences and impact on patient's life structure, values, " "norms, and healthcare: Spiritual Beliefs: grew up Muslim .  Contextual influences on patient's health include: Contextual Factors: Individual Factors MICD and Family Factors MICD .  Cultural, Contextual, and socioeconomic factors do not affect the patient's access to services.  These factors will be addressed in the Preliminary Treatment plan.  Patient identified her preferred language to be English. Patient reported they do not  need the assistance of an  or other support involved in therapy.     Patient reported had no significant delays in developmental tasks.   Patient's highest education level was some college. Patient identified the following learning problems: none reported.  Modifications will be used to assist communication in therapy.  Patient reports that she is  able to understand written materials.    Patient reported the following relationship history \"single never \".  Patient's current relationship status is single.   Patient identified their sexual orientation as heterosexual.  Patient reported having zero child(renee). Patient identified  Cousin named Asa  as part of their support system.  Patient identified the quality of these relationships as good.     Patient's current living/housing situation involves homelessness.Pt reports she has been experiencing homelessness recently and has been staying in a motel for the last 5 months.  She report that housing is stable.     Patient reports that she was working in the [employer] at [employer].  Patient reports their finances are obtained through  none .  Patient does identify finances as a current stressor.      Patient reported that they have not been involved with the legal system. Patient denies being on probation / parole / under the jurisdiction of the court.    Patient's Strengths and Limitations:  Patient identified the following strengths or resources that will help them succeed in treatment: commitment to health and well being, community " involvement, exercise routine, ortiz / spirituality, friends / good social support, family support, insight, intelligence, motivation, sober support group / recovery support , and sponsor. Things that may interfere with the patient's success in treatment include: few friends, financial hardship, lack of family support, and lack of social support.     Assessments:  The following assessments were completed by patient for this visit:  PHQ9:       5/3/2024     3:00 PM 5/8/2024    12:00 PM   PHQ-9 SCORE   PHQ-9 Total Score 15 19     GAD7:       5/3/2024     3:00 PM 5/8/2024    12:00 PM   ESTEPHANIE-7 SCORE   Total Score 13 18     CAGE-AID:       5/8/2024    12:00 PM   CAGE-AID Total Score   Total Score 4     PROMIS 10-Global Health (all questions and answers displayed):       5/8/2024    12:00 PM   PROMIS 10   In general, would you say your health is: 2   In general, would you say your quality of life is: 2   In general, how would you rate your physical health? 2   In general, how would you rate your mental health, including your mood and your ability to think? 1   In general, how would you rate your satisfaction with your social activities and relationships? 1   In general, please rate how well you carry out your usual social activities and roles. (This includes activities at home, at work and in your community, and responsibilities as a parent, child, spouse, employee, friend, etc.) 1   To what extent are you able to carry out your everyday physical activities such as walking, climbing stairs, carrying groceries, or moving a chair? 2   In the past 7 days, how often have you been bothered by emotional problems such as feeling anxious, depressed, or irritable? 5   In the past 7 days, how would you rate your fatigue on average? 5   In the past 7 days, how would you rate your pain on average, where 0 means no pain, and 10 means worst imaginable pain? 8   Global Mental Health Score 5   Global Physical Health Score 7   PROMIS TOTAL  - SUBSCORES 12     [place] Suicide Severity Rating Scale (Short Version)      8/18/2020    10:46 AM 8/23/2020     8:13 PM 9/6/2020     7:22 PM 5/2/2024     9:46 AM 5/2/2024    11:55 AM 5/3/2024    10:52 AM 5/3/2024     4:00 PM   [place] Suicide Severity Rating (Short Version)   Over the past 2 weeks have you felt down, depressed, or hopeless? yes no no       Over the past 2 weeks have you had thoughts of killing yourself? no no no       Have you ever attempted to kill yourself? no no no       Q1 Wished to be Dead (Past Month)    1-->yes 1-->yes  1-->yes   Q2 Suicidal Thoughts (Past Month)    1-->yes 1-->yes  1-->yes   Q3 Suicidal Thought Method    0-->no 1-->yes  1-->yes   Q4 Suicidal Intent without Specific Plan    0-->no 1-->yes  0-->no   Q5 Suicide Intent with Specific Plan    0-->no 0-->no  0-->no   Q6 Suicide Behavior (Lifetime)    0-->no   1-->yes   Within the Past 3 Months?       0-->no   Level of Risk per Screen    low risk high risk  moderate risk   1. Wish to be Dead (Since Last Contact)      N    2. Non-Specific Active Suicidal Thoughts (Since Last Contact)      N    Actual Attempt (Since Last Contact)      N    Has subject engaged in non-suicidal self-injurious behavior? (Since Last Contact)      N    Interrupted Attempts (Since Last Contact)      N    Aborted or Self-Interrupted Attempt (Since Last Contact)      N    Preparatory Acts or Behavior (Since Last Contact)      N    Suicide (Since Last Contact)      N    Calculated C-SSRS Risk Score (Since Last Contact)      No Risk Indicated        Personal and Family Medical History:  Patient does not report a family history of mental health concerns.  Patient reports family history is not on file..     Patient does report Mental Health Diagnosis and/or Treatment.  Patient Patient reported the following previous diagnoses which include(s): an Anxiety Disorder and Depression.  Patient reported symptoms began at the age of 14.   Patient has received mental  health services in the past:  psychiatry .  Pt reports a Hx of SI (most recently about 3 days ago, Hx of intent x1 via intentional overdose on Percocets, pt reports she has never attempted).  Patient denies a history of civil commitment.  Patient is not receiving other mental health services.  These include none.       Patient has had a physical exam to rule out medical causes for current symptoms.  Date of last physical exam was within the past year. Client was encouraged to follow up with PCP if symptoms were to develop. Pt reports she goes to a clinic on [facility] for Primary Care.  Patient reports no current medical concerns and the following current dental concerns: needs dentures .  Patient denies any issues with pain..   There are significant appetite / nutritional concerns / weight changes. These may include: loss of weight. Patient reports the following sleep concerns:  No concerns.   Patient does not report a history of head injury / trauma / cognitive impairment.      Patient reports current meds as:   Current Outpatient Medications   Medication Sig Dispense Refill    acetaminophen (TYLENOL) 500 MG tablet Take 500-1,000 mg by mouth every 8 hours as needed for mild pain      alum & mag hydroxide-simethicone (MAALOX) 200-200-20 MG/5ML SUSP suspension Take 30 mLs by mouth every 6 hours as needed for indigestion      benzocaine-menthol (CEPACOL) 15-3.6 MG lozenge Place 1 lozenge inside cheek every 2 hours as needed for sore throat      escitalopram (LEXAPRO) 10 MG tablet Take 1 tablet (10 mg) by mouth daily      ibuprofen (ADVIL/MOTRIN) 200 MG tablet Take 600 mg by mouth every 6 hours as needed for pain      loratadine (CLARITIN) 10 MG tablet Take 10 mg by mouth daily      naltrexone (DEPADE/REVIA) 50 MG tablet Take 1/2 tablet for 1 week, then increase to 1 tablet daily 30 tablet 0    QUEtiapine (SEROQUEL) 25 MG tablet Take 1 tablet (25 mg) by mouth nightly as needed (insomnia)      senna-docusate  (SENOKOT-S/PERICOLACE) 8.6-50 MG tablet Take 2 tablets by mouth daily as needed for constipation      guaiFENesin-dextromethorphan (ROBITUSSIN DM) 100-10 MG/5ML syrup Take 10 mLs by mouth 4 times daily as needed for cough (Patient not taking: Reported on 2024)      melatonin 5 MG tablet Take 5 mg by mouth nightly as needed for sleep (Patient not taking: Reported on 2024)       No current facility-administered medications for this encounter.     Facility-Administered Medications Ordered in Other Encounters   Medication Dose Route Frequency Provider Last Rate Last Admin    Self Administer Medications: Behavioral Services   Does not apply See Admin Instructions Brenda Negrete MD           Medication Adherence:  Patient reports taking prescribed medications as prescribed.    Patient Allergies:    Allergies   Allergen Reactions    Vicodin [Hydrocodone-Acetaminophen]        Medical History:    Past Medical History:   Diagnosis Date    Alteration in appetite     Benign tumor of brain (H)     Chronic cough     Constipation     Depression     Dizziness     Hypertension     Numbness and tingling     Weight loss          Current Mental Status Exam:   Appearance:  Appropriate    Eye Contact:  Good   Psychomotor:  Normal       Gait / station:  no problem  Attitude / Demeanor: Cooperative   Speech      Rate / Production: Normal/ Responsive      Volume:  Normal  volume      Language:  intact  Mood:   Depressed   Affect:   Appropriate    Thought Content: Clear   Thought Process: Coherent       Associations: No loosening of associations  Insight:   Fair   Judgment:  Intact   Orientation:  All  Attention/concentration: Fair    Substance Use:   Patient did report a family history of substance use concerns; see medical history section for details.  Pt reports 2 paternal uncles used to use IV, and 1 cousin who  as a result.  Pt also endorses AUD in her family. Patient has received chemical dependency treatment in the past  "at 25-30 years ago at [facility], has been in fourth treatment at this time.  Patient has been to detox once.      Patient is currently receiving the following services: CD Treatment at + . Patient reported the following problems as a result of her substance use:   none reported .    Patient reports using alcohol, Pt reports relapsing about 1 week ago after 8 years, reports she has been using \"nonstop\" until coming to the hospital, est she has drank 2-3 gallons of Gretchen and beer in the last week. Patient first started drinking at age 14.  Patient reported date of last use was 5/1/24.      Patient reports using tobacco, smokes 1 pack of cigarettes every 3 days, patches . Client started using tobacco at age 23..  Patient reports using cannabis, smoking 3 joints per day since relapsing about 1 week ago. Patient started using cannabis at age 14.  Patient reports last use was 5/1/24.      Patient denies using caffeine.  Patient reports using/abusing the following substance(s). Patient reports using cocaine/crack, using 4 8-balls in the last week since relapsing. Client started using cocaine/crack at age 22.  Patient reports last use was 5/1/24.  Route of administration:  smoked/snorted   Patient reports using opiates. Pt reports Hx of Rx Percocet but denies ever abusing this medication or taking more than prescribed    Substance Use: daily use, substance use at work, substance related decrease in work performance, work absence due to substance use, family relationship problems due to substance use, and cravings/urges to use    Based on the positive CAGE score and clinical interview there  are indications of drug or alcohol abuse. Recommendation for substance abuse disorder evaluation with a substance use professional was given. Therapist did recommend client to reduce use or abstain from alcohol or substance use. Therapist did recommend structured treatment and or community support (AA, 12 step group, etc.). NA " .    Significant Losses / Trauma / Abuse / Neglect Issues:   Patient did not serve in the .  There are indications or report of significant loss, trauma, abuse or neglect issues related to: are no indications and client denies any losses, trauma, abuse, or neglect concerns.  Concerns for possible neglect are not present.     Safety Assessment:   Patient denies current homicidal ideation and behaviors.  Patient denies current self-injurious ideation and behaviors.    Patient denied risk behaviors associated with substance use.   Patient reported substance use associated with mental health symptoms.  Patient reports the following current concerns for their personal safety: None.  Patient reports there are no firearms in the house.       There are no firearms in the home..    History of Safety Concerns:  Patient eported a history of homicidal ideation to where she tried to cut someone throat and went to [facility] for it while unde the influence of substances.  Patient denied a history of personal safety concerns.    Patient denied a history of assaultive behaviors.    Patient denied a history of sexual assault behaviors.     Patient denied a history of risk behaviors associated with substance use.  Patient reported a history of substance use associated with mental health symptoms.  Patient reports the following protective factors:      Risk Plan:  See Recommendations for Safety and Risk Management Plan    Review of Symptoms per patient report:   Depression: Change in sleep, Lack of interest, Excessive or inappropriate guilt, Change in energy level, Difficulties concentrating, Change in appetite, Feelings of hopelessness, Feelings of helplessness, Low self-worth, Ruminations, Irritability, Feeling sad, down, or depressed, Withdrawn, Frequent crying, and Anger outbursts  Yamila:  No Symptoms  Psychosis: No Symptoms  Anxiety: Excessive worry, Nervousness, Physical complaints, such as headaches, stomachaches, muscle  tension, Sleep disturbance, Ruminations, Poor concentration, and Irritability  Panic:  No symptoms  Post Traumatic Stress Disorder:  No Symptoms   Eating Disorder: No Symptoms  ADD / ADHD:  No symptoms  Conduct Disorder: No symptoms  Autism Spectrum Disorder: No symptoms  Obsessive Compulsive Disorder: No Symptoms    Patient reports the following compulsive behaviors and treatment history: none reported.      Diagnostic Criteria:   Generalized Anxiety Disorder  A. Excessive anxiety and worry about a number of events or activities (such as work or school performance).   B. The person finds it difficult to control the worry.  C. Select 3 or more symptoms (required for diagnosis). Only one item is required in children.   - Restlessness or feeling keyed up or on edge.    - Being easily fatigued.    - Difficulty concentrating or mind going blank.    - Irritability.    - Sleep disturbance (difficulty falling or staying asleep, or restless unsatisfying sleep).   D. The focus of the anxiety and worry is not confined to features of an Axis I disorder.  E. The anxiety, worry, or physical symptoms cause clinically significant distress or impairment in social, occupational, or other important areas of functioning.   F. The disturbance is not due to the direct physiological effects of a substance (e.g., a drug of abuse, a medication) or a general medical condition (e.g., hyperthyroidism) and does not occur exclusively during a Mood Disorder, a Psychotic Disorder, or a Pervasive Developmental Disorder. Major Depressive Disorder  CRITERIA (A-C) REPRESENT A MAJOR DEPRESSIVE EPISODE - SELECT THESE CRITERIA  A) Recurrent episode(s) - symptoms have been present during the same 2-week period and represent a change from previous functioning 5 or more symptoms (required for diagnosis)   - Depressed mood. Note: In children and adolescents, can be irritable mood.     - Diminished interest or pleasure in all, or almost all, activities.    -  Significant weight gaindecrease in appetite.    - Decreased sleep.    - Psychomotor activity agitation.    - Fatigue or loss of energy.    - Feelings of worthlessness or inappropriate guilt.    - Diminished ability to think or concentrate, or indecisiveness.   B) The symptoms cause clinically significant distress or impairment in social, occupational, or other important areas of functioning  C) The episode is not attributable to the physiological effects of a substance or to another medical condition  D) The occurence of major depressive episode is not better explained by other thought / psychotic disorders  E) There has never been a manic episode or hypomanic episode    Functional Status:  Patient reports the following functional impairments:  health maintenance, relationship(s), self-care, and work / vocational responsibilities.     Nonprogrammatic care:  Patient is requesting basic services to address current mental health concerns.    Clinical Summary:  1. Psychosocial, Cultural and Contextual Factors: isolation, lack of family support, homelessness, helplessness, hopelessness.  2. Principal DSM5 Diagnoses  (Sustained by DSM5 Criteria Listed Above):   Substance-Related & Addictive Disorders Alcohol Use Disorder   303.90 (F10.20) Severe In a controlled environment  305.20 (F12.10) Cannabis Use Disorder Mild  In a controlled environment  Stimulant Use Disorder:  In a controlled environment, Specify current severity:  Severe  304.20 (F14.20) Severe, Cocaine.  3. Other Diagnoses that is relevant to services:   296.32 (F33.1) Major Depressive Disorder, Recurrent Episode, Moderate _ and With melancholic features  300.02 (F41.1) Generalized Anxiety Disorder.  4. Provisional Diagnosis:  none.  5. Prognosis: Relieve Acute Symptoms.  6. Likely consequences of symptoms if not treated: patient's ongoing symptoms are more than likely to get worse and experience a decreased daily in functioning and may require a higher level  of care.  7. Client strengths include:  committed to sobriety, educated, employed, has a previous history of therapy, insightful, motivated, and support of family, friends and providers .     Alcohol Use Disorder Severe F10.20  Stimulant Use Disorder Severe F14.20  Cannabis Use Disorder Mild F12.20    Recommendations:     1. Plan for Safety and Risk Management:   Safety and Risk: Recommended that patient call 911 or go to the local ED should there be a change in any of these risk factors..          Report to child / adult protection services was NA.     2. Patient's identified no ortiz / Jewish / spiritual influences relevant to services at this time.    3. Initial Treatment will focus on:   Depressed Mood -   Anxiety -   Adjustment Difficulties related to: unemployment and housing  Relational Problems related to: Conflict or difficulties with family members  Risk Management / Safety Concerns related to: Suicidal ideation  Alcohol / Substance Use -      4. Resources/Service Plan:    services are not indicated.   Modifications to assist communication are not indicated.   Additional disability accommodations are not indicated.      5. Collaboration:   Collaboration / coordination of treatment will be initiated with the following  support professionals: Targeted Case Management (TCM).      6.  Referrals:   The following referral(s) will be initiated: Psychiatry.       A Release of Information has been obtained for the following: Targeted Case Management (TCM).     Clinical Substantiation/medical necessity for the above recommendations:  Patient is a 59-year-old heterosexual  single female with no children who presents with a history of substance use disorder, anxiety, and depression. Patient is currently attending [facility] to address his alcoholism and drugs use problems. Patient lacks long-term sober maintenance skills, lacks sober coping skills, lacks a sober peer support network, and  has continued to use mood altering substances as a coping mechanism. Patient has unstable housing, lacks a sober living environment, would benefit from developing long-term sober maintenance skills, would benefit from developing sober coping skills, would benefit from developing a sober peer support network, and has mental health symptoms which are exacerbated by substance abuse. Patient would benefit from sober living environment, a medications adjustment because she reports that she is still feeling depressed, sober group meetings and employment..    7. KYARA:    KYARA:  Discussed the general effects of drugs and alcohol on health and well-being and Attend a sober community support program including AA, [facility], [facility], etc.).. Provider gave patient printed information about the effects of chemical use on her health and well being. Recommendations:  maintain abstinence.     8. Records:   These were reviewed at time of assessment.   Information in this assessment was obtained from the medical record and  provided by patient who is a fair historian.    Patient will have open access to their mental health medical record.    9.   Interactive Complexity: No    10. Safety Plan:   Saroj-Brown Safety Plan      Creation Date: 5/3/24       Step 1: Warning signs:    Warning Signs    Urges and cravings to drink alcohol or use crack cocaine    Persistent and worsening of depression    Intrusive thoughts for self harm or suicide      Step 2: Internal coping strategies - Things I can do to take my mind off my problems without contacting another person:    Strategies    Keeping close to my ortiz    Utilizing daily prayer      Step 3: People and social settings that provide distraction:    Name Contact Information    Talking with friends and family     Relying on strength from ortiz at [facility]     Spending time with nieces, nephews, cousins, and siblings        Places    [facility] [address]     "Taking walks to a park for grounding    Going to visit with family      Step 4: People whom I can ask for help during a crisis:    Name Contact Information    NICOLE ZAZUETA (Cousin) [phone]    Siblings     [facility]      [address] Crisis [phone]      Step 5: Professionals or agencies I can contact during a crisis:    Clinician/Agency Name Phone Emergency Contact    [facility] [address] [place] [phone]    [facility] Support [phone] Or text \"HelpLine\" to [phone]      Local Emergency Department Emergency Department Address Emergency Department Phone    [address] [address] [phone]      Suicide Prevention Lifeline Phone: Call or Text 926  Crisis Text Line: Text HOME to 768338     Step 6: Making the environment safer (plan for lethal means safety):   Did not identify any lethal methods     Optional: What is most important to me and worth living for?:   My ortiz in God is Important! I want to be there for my family, my nieces, nephews, cousins, and siblings!     Flower Safety Plan. Lindsay Kyle and Dandre Loo. Used with permission of the authors.           Provider Name/ Credentials:  Berry Greenberg, TRAC,  LADC  Dual   Phone: [phone]  Fax: [phone]     May 8, 2024          "

## 2024-05-08 NOTE — GROUP NOTE
Group Therapy Documentation    PATIENT'S NAME: Alejandrina Clement  MRN:   [id number]  :   1965  ACCT. NUMBER: [id number]  DATE OF SERVICE: 24  START TIME:  9:45 AM  END TIME: 11:30 AM  FACILITATOR(S): Opal Becerril LADC; Maria Elena Harmon LADC  TOPIC: BEH Group Therapy  Number of patients attending the group:  10  Group Length:  2 Hours    Group Therapy Type: Recovery strategies, Emotion processing, and Health and wellbeing     Summary of Group / Topics Discussed:    Co-occurring illnesses symptom management, Mindfulness/Relaxation, and Emotions/expression   Recovery Principles, Co-occurring Illness Education and Symptom management, Emotions/expression, Relapse Prevention.     Patients completed daily check ins and the question of the day  Patients engaged in reading and processing daily meditations.    Facilitator: CANDIE Valente, LICHA   Understanding Depression. Discussed about signs and symptoms and social Support      Group Attendance:  Attended group session    Patient's response to the group topic/interactions:  cooperative with task    Patient appeared to be Engaged.        Client specific details:  Patient shared feeling proud of being clean and sober. Patient shared that she is grateful and wanting to overcome triggers that might affect her from being truthful with herself. Patient shared appropriate feedbacks during discussion.

## 2024-05-09 ENCOUNTER — HOSPITAL ENCOUNTER (OUTPATIENT)
Dept: BEHAVIORAL HEALTH | Facility: CLINIC | Age: 59
Discharge: HOME | End: 2024-05-09
Attending: FAMILY MEDICINE
Payer: COMMERCIAL

## 2024-05-09 LAB
HCV RNA SERPL NAA+PROBE-ACNC: ABNORMAL IU/ML
HCV RNA SERPL NAA+PROBE-LOG IU: 5 {LOG_IU}/ML

## 2024-05-09 PROCEDURE — H2035 A/D TX PROGRAM, PER HOUR: HCPCS | Mod: HQ

## 2024-05-09 PROCEDURE — 1002N00001 HC LODGING PLUS FACILITY CHARGE ADULT

## 2024-05-09 NOTE — PROGRESS NOTES
At the request of ordering provider, patient was informed of the (negative) results of recent STD-related testing.  She verbalized understanding of, and reported being pleased about, said results.

## 2024-05-09 NOTE — GROUP NOTE
"Group Therapy Documentation    PATIENT'S NAME: Alejandrina Clement  MRN:   [id number]  :   1965  ACCT. NUMBER: [id number]  DATE OF SERVICE: 24  START TIME: 12:30 PM  END TIME:  2:30 PM  FACILITATOR(S): Elizabeth Wall LADC; Fanny Sanford LADC; Opal Becerril LADC  TOPIC: BEH Group Therapy  Number of patients attending the group:  9  Group Length:  2 Hours    Group Therapy Type: Recovery strategies, Emotion processing, and Health and wellbeing     Summary of Group / Topics Discussed:      Patients engaged in a thorough discussion on  self-discovery , and  \"self-rejection\". Patients shared their personal experiences, pertaining to the topic, and discuss how this can help with their process of  self-awareness, coping skills, with life events and challenges.             Group Attendance:  Attended group session    Patient's response to the group topic/interactions:  cooperative with task, discussed personal experience with topic, expressed understanding of topic, and listened actively    Patient appeared to be Engaged.        Client specific details:  More, engaged in group discussion and was open for positive feed-back from her peers.    "

## 2024-05-09 NOTE — GROUP NOTE
Group Therapy Documentation    PATIENT'S NAME: Alejandrina Clement  MRN:   [id number]  :   1965  ACCT. NUMBER: [id number]  DATE OF SERVICE: 24  START TIME:  3:00 PM  END TIME:  4:00 PM  FACILITATOR(S): Dinorah Bryant LADC; Jerry Shah LADC  TOPIC: BEH Group Therapy  Number of patients attending the group:  29  Group Length:  1 Hours    Group Therapy Type: Health and wellbeing     Summary of Group / Topics Discussed:    Balanced lifestyle and Disease of addiction    Group Attendance:  Attended group session    Patient's response to the group topic/interactions:  cooperative with task    Patient appeared to be Attentive and Engaged.        Client specific details: Pt listened respectfully to Dr. Bolton's presentation on the neurobiology of addiction and asked appropriate questions.   72 yo M presents with c/o tooth ache x 1 week.  Worse today, no fever or chills, no ear pain, no sore throat.  On exam pt in NAD AAO x 3,  + general decay with missing teeth + selling to left upper gums around tooth # 15, no drainage, no lad, no sublingual swelling

## 2024-05-09 NOTE — GROUP NOTE
Group Therapy Documentation    PATIENT'S NAME: Alejandrina Clement  MRN:   [id number]  :   1965  ACCT. NUMBER: [id number]  DATE OF SERVICE: 24  START TIME:  9:00 AM  END TIME: 11:00 AM  FACILITATOR(S): Opal Becerril LADC  TOPIC: BEH Group Therapy  Number of patients attending the group:  9  Group Length:  2 Hours    Group Therapy Type: Recovery strategies and Emotion processing    Summary of Group / Topics Discussed:    Recovery Principles, Relationship/socialization, Mindfulness/Relaxation, and Emotions/expression  Daily check in; reflection reading and discussion; assignment presentations and feedbacks.      Group Attendance:  Attended group session    Patient's response to the group topic/interactions:  cooperative with task    Patient appeared to be Engaged.        Client specific details:  patient shared appropriate feedbacks.

## 2024-05-10 ENCOUNTER — HOSPITAL ENCOUNTER (OUTPATIENT)
Dept: BEHAVIORAL HEALTH | Facility: CLINIC | Age: 59
Discharge: HOME | End: 2024-05-10
Attending: FAMILY MEDICINE
Payer: COMMERCIAL

## 2024-05-10 DIAGNOSIS — F17.200 NICOTINE DEPENDENCE: Primary | ICD-10-CM

## 2024-05-10 PROCEDURE — 1002N00001 HC LODGING PLUS FACILITY CHARGE ADULT

## 2024-05-10 PROCEDURE — H2035 A/D TX PROGRAM, PER HOUR: HCPCS | Mod: HQ

## 2024-05-10 RX ORDER — NICOTINE 21 MG/24HR
1 PATCH, TRANSDERMAL 24 HOURS TRANSDERMAL EVERY 24 HOURS
Qty: 28 PATCH | Refills: 1 | Status: SHIPPED | OUTPATIENT
Start: 2024-05-10

## 2024-05-10 NOTE — GROUP NOTE
Group Therapy Documentation    PATIENT'S NAME: Alejandrina Clement  MRN:   [id number]  :   1965  ACCT. NUMBER: [id number]  DATE OF SERVICE: 5/10/24  START TIME: 12:30 PM  END TIME:  2:30 PM  FACILITATOR(S): Dinorah Bryant LADC; Jerry Shah LADC; Fanny Sanford LADC  TOPIC: BEH Group Therapy  Number of patients attending the group:  28  Group Length:  2 Hours    Group Therapy Type: Recovery strategies    Summary of Group / Topics Discussed:    Relationship/socialization, Leisure explorations/use of leisure time, and Self-care activities    Group Attendance:  Attended group session    Patient's response to the group topic/interactions:  cooperative with task    Patient appeared to be Attentive and Engaged.        Client specific details: Pt watched a recovery-related film with peers and took part in the subsequent discussion.

## 2024-05-10 NOTE — GROUP NOTE
"Group Therapy Documentation    PATIENT'S NAME: Alejandrina Clement  MRN:   [id number]  :   1965  ACCT. NUMBER: [id number]  DATE OF SERVICE: 5/10/24  START TIME:  9:00 AM  END TIME: 11:00 AM  FACILITATOR(S): Opal Becerril LADC  TOPIC: BEH Group Therapy  Number of patients attending the group:  8  Group Length:  2 Hours    Group Therapy Type: Recovery strategies and Emotion processing    Summary of Group / Topics Discussed:    Recovery Principles, Relationship/socialization, Mindfulness/Relaxation, and Emotions/expression  Daily check-in; reflection reading and feedbacks; topic on \"self defeating behaviors\" and \"6 ultimate rules for life\" discussion. Nature walk.      Group Attendance:  Attended group session    Patient's response to the group topic/interactions:  cooperative with task    Patient appeared to be Engaged.        Client specific details:  Patient excused during first part of group for a medical appointment. Patient returned to group and shared appropriate feedbacks and participated in group discussion.    "

## 2024-05-10 NOTE — PROGRESS NOTES
Patient received resources for sober housing that works with [facility] today.  CANDIE Glass on 5/10/2024 at 12:53 PM

## 2024-05-11 ENCOUNTER — HOSPITAL ENCOUNTER (OUTPATIENT)
Dept: BEHAVIORAL HEALTH | Facility: CLINIC | Age: 59
Discharge: HOME | End: 2024-05-11
Attending: FAMILY MEDICINE
Payer: COMMERCIAL

## 2024-05-11 PROCEDURE — 1002N00001 HC LODGING PLUS FACILITY CHARGE ADULT

## 2024-05-11 PROCEDURE — H2035 A/D TX PROGRAM, PER HOUR: HCPCS | Mod: HQ

## 2024-05-11 NOTE — GROUP NOTE
"Psychoeducation Group Documentation    PATIENT'S NAME: Alejandrina Clement  MRN:   [id number]  :   1965  ACCT. NUMBER: [id number]  DATE OF SERVICE: 24  START TIME: 12:30 PM  END TIME:  2:30 PM  FACILITATOR(S): Jhon Natarajan LADC; Elizabeth Wall LADC; Anthony Wooten LADC  TOPIC: BEH Pyschoeducation  Number of patients attending the group:  9  Group Length:  2 Hours    Skills Group Therapy Type: Recovery skills, Emotion regulation skills, Daily living/independence skills, and Healthy behaviors development    Summary of Group / Topics Discussed:    Relationship/social skills and Symptom management skills.    Patients attended psychoeducational lecture on \"Communication Skills\". Patients gained knowledge on why communication is effective in treating co-occurring use disorder and negative behavioral patterns. Patients learned about the four components of effective communication and interpersonal effectiveness.          Group Attendance:  Attended group session    Patient's response to the group topic/interactions:  cooperative with task    Patient appeared to be Engaged.         Client specific details:  More, was attentive during lecture.      "

## 2024-05-11 NOTE — GROUP NOTE
Group Therapy Documentation    PATIENT'S NAME: Alejandrina Clement  MRN:   [id number]  :   1965  ACCT. NUMBER: [id number]  DATE OF SERVICE: 24  START TIME:  9:00 AM  END TIME: 11:00 AM  FACILITATOR(S): Anthony Wooten LADC  TOPIC: BEH Group Therapy  Number of patients attending the group:  10  Group Length:  2 Hours    Group Therapy Type: Recovery strategies    Summary of Group / Topics Discussed:    Relationship/socialization      Group Attendance:  Attended group session    Patient's response to the group topic/interactions:  cooperative with task    Patient appeared to be Attentive and Engaged.        Client specific details:  The patient participated in the afternoon lecture on recovery resources.

## 2024-05-12 ENCOUNTER — HOSPITAL ENCOUNTER (OUTPATIENT)
Dept: BEHAVIORAL HEALTH | Facility: CLINIC | Age: 59
Discharge: HOME | End: 2024-05-12
Attending: FAMILY MEDICINE
Payer: COMMERCIAL

## 2024-05-12 PROCEDURE — 1002N00001 HC LODGING PLUS FACILITY CHARGE ADULT

## 2024-05-12 PROCEDURE — H2035 A/D TX PROGRAM, PER HOUR: HCPCS | Mod: HQ

## 2024-05-12 NOTE — GROUP NOTE
Psychoeducation Group Documentation    PATIENT'S NAME: Alejandrina Clement  MRN:   [id number]  :   1965  ACCT. NUMBER: [id number]  DATE OF SERVICE: 24  START TIME:  8:45 AM  END TIME: 10:45 AM  FACILITATOR(S): Ori Pantoja RN; Elizabeth Wall LADC  TOPIC: BEH Pyschoeducation  Number of patients attending the group:  9  Group Length:  2 Hours    Skills Group Therapy Type: Daily living/independence skills, Healthy behaviors development, and Medication education    Summary of Group / Topics Discussed:    Symptom management skills and Medication management skills.      This patient attended Lecture presented by: Nursing staff on HIV-Aids, and prevention and treatments. Patient engaged in Q&A after the lecture was presented. Patient actively engaged in group lecture. Each patient had an opportunity to process the information and ask questions.        Group Attendance:  Attended group session    Patient's response to the group topic/interactions:  cooperative with task    Patient appeared to be Engaged.         Client specific details:  More, participated in lecture discussion

## 2024-05-12 NOTE — GROUP NOTE
Psychoeducation Group Documentation    PATIENT'S NAME: Alejandrina Clement  MRN:   [id number]  :   1965  ACCT. NUMBER: [id number]  DATE OF SERVICE: 24  START TIME: 12:30 PM  END TIME:  1:30 PM  FACILITATOR(S): Anthony Wooten LADC; Elizabeth Wall LADC  TOPIC: BEH Pyschoeducation  Number of patients attending the group:  31  Group Length:  1 Hours    Skills Group Therapy Type: Recovery skills    Summary of Group / Topics Discussed:    Relationship/social skills, Balanced lifestyle skills, and Coping/DBT skills        Group Attendance:  Attended group session    Patient's response to the group topic/interactions:  cooperative with task    Patient appeared to be Attentive and Engaged.         Client specific details:  The patient participated in the afternoon lecture on personal strengths.

## 2024-05-13 ENCOUNTER — HOSPITAL ENCOUNTER (OUTPATIENT)
Dept: BEHAVIORAL HEALTH | Facility: CLINIC | Age: 59
Discharge: HOME | End: 2024-05-13
Attending: FAMILY MEDICINE
Payer: COMMERCIAL

## 2024-05-13 ENCOUNTER — TELEPHONE (OUTPATIENT)
Dept: BEHAVIORAL HEALTH | Facility: CLINIC | Age: 59
End: 2024-05-13
Payer: COMMERCIAL

## 2024-05-13 DIAGNOSIS — F51.01 PRIMARY INSOMNIA: ICD-10-CM

## 2024-05-13 PROCEDURE — H2035 A/D TX PROGRAM, PER HOUR: HCPCS | Mod: HQ

## 2024-05-13 PROCEDURE — 1002N00001 HC LODGING PLUS FACILITY CHARGE ADULT

## 2024-05-13 RX ORDER — QUETIAPINE FUMARATE 25 MG/1
25-50 TABLET, FILM COATED ORAL
Status: SHIPPED
Start: 2024-05-13 | End: 2024-05-15

## 2024-05-13 NOTE — TELEPHONE ENCOUNTER
Pt is requesting a higher dose of seroquel for sleep pt is currently taking 25 mg at bed. She has an appt with psychiatry on 5/15, but as hoping for an increase prior to appt/ please advise

## 2024-05-13 NOTE — GROUP NOTE
"Group Therapy Documentation    PATIENT'S NAME: Alejandrina Clement  MRN:   [id number]  :   1965  ACCT. NUMBER: [id number]  DATE OF SERVICE: 24  START TIME: 12:30 PM  END TIME:  2:30 PM  FACILITATOR(S): Dinorah Bryant LADC  TOPIC: BEH Group Therapy  Number of patients attending the group:  8  Group Length:  2 Hours    Group Therapy Type: Recovery strategies    Summary of Group / Topics Discussed:    Sober coping skills and Relapse prevention    Group Attendance:  Attended group session    Patient's response to the group topic/interactions:  cooperative with task    Patient appeared to be Actively participating, Attentive, and Engaged.        Client specific details: Pt offered he peer feedback on her \"Relapse Autopsy\" assignment She took part in a group conversation about recovery group norms and participated in an activity discussing specific relapse prevention scenarios.  "

## 2024-05-13 NOTE — TELEPHONE ENCOUNTER
Ok to increase to Seroquel take 25-50 mg po at hs prn for insomnia. Medication list changed. Please let me know if additional rx needed or if the patient has sufficient supply until she see's psychiatry for further eval and management.     1. Primary insomnia  - QUEtiapine (SEROQUEL) 25 MG tablet; Take 1-2 tablets (25-50 mg) by mouth nightly as needed (insomnia)    RICARDO Morrell CNP on 5/13/2024 at 4:09 PM

## 2024-05-13 NOTE — GROUP NOTE
"Group Therapy Documentation    PATIENT'S NAME: Alejandrina Clement  MRN:   [id number]  :   1965  ACCT. NUMBER: [id number]  DATE OF SERVICE: 24  START TIME:  9:00 AM  END TIME: 11:00 AM  FACILITATOR(S): Opal Becerril LADC  TOPIC: BEH Group Therapy  Number of patients attending the group:  8  Group Length:  2 Hours    Group Therapy Type: Recovery strategies and Emotion processing    Summary of Group / Topics Discussed:    Recovery Principles, Mindfulness/Relaxation, Disease of addiction, and Emotions/expression  Daily check-in with feelings and green sheets, reflection reading and discussion; assignment presentations and feedbacks.      Group Attendance:  Attended group session    Patient's response to the group topic/interactions:  cooperative with task    Patient appeared to be Engaged.        Client specific details:  Patient shared feeling joyful. Patient listened attentively and shared appropriate feedbacks. Patient shared and processed her \"Step One\" assignment.    "

## 2024-05-14 ENCOUNTER — HOSPITAL ENCOUNTER (OUTPATIENT)
Dept: BEHAVIORAL HEALTH | Facility: CLINIC | Age: 59
Discharge: HOME | End: 2024-05-14
Attending: FAMILY MEDICINE
Payer: COMMERCIAL

## 2024-05-14 LAB — HCV GENTYP SERPL NAA+PROBE: NORMAL

## 2024-05-14 PROCEDURE — H2035 A/D TX PROGRAM, PER HOUR: HCPCS | Mod: HQ

## 2024-05-14 PROCEDURE — 1002N00001 HC LODGING PLUS FACILITY CHARGE ADULT

## 2024-05-14 NOTE — PROGRESS NOTES
[facility] Weekly Treatment Plan Review        Date span:  24 - - 24    Patient did not have any absences during this time period (list absence dates and reason for absence).     Treatment Plan initiated on: 24    Dimension1: Acute Intoxication/Withdrawal Potential -   Previous Dimension Ratin  Current Dimension Ratin  Date of Last Use 24  Any reports of withdrawal symptoms - No      Dimension 2: Biomedical Conditions & Complications -   Previous Dimension Ratin  Current Dimension Ratin  Medical Concerns:  None Reported  Current Medications & Medication Changes:  Current Outpatient Medications   Medication Sig Dispense Refill    QUEtiapine (SEROQUEL) 25 MG tablet Take 1-2 tablets (25-50 mg) by mouth nightly as needed (insomnia)      acetaminophen (TYLENOL) 500 MG tablet Take 500-1,000 mg by mouth every 8 hours as needed for mild pain      alum & mag hydroxide-simethicone (MAALOX) 200-200-20 MG/5ML SUSP suspension Take 30 mLs by mouth every 6 hours as needed for indigestion      benzocaine-menthol (CEPACOL) 15-3.6 MG lozenge Place 1 lozenge inside cheek every 2 hours as needed for sore throat      escitalopram (LEXAPRO) 10 MG tablet Take 1 tablet (10 mg) by mouth daily      guaiFENesin-dextromethorphan (ROBITUSSIN DM) 100-10 MG/5ML syrup Take 10 mLs by mouth 4 times daily as needed for cough (Patient not taking: Reported on 2024)      ibuprofen (ADVIL/MOTRIN) 200 MG tablet Take 600 mg by mouth every 6 hours as needed for pain      loratadine (CLARITIN) 10 MG tablet Take 10 mg by mouth daily      melatonin 5 MG tablet Take 5 mg by mouth nightly as needed for sleep (Patient not taking: Reported on 2024)      naltrexone (DEPADE/REVIA) 50 MG tablet Take 1/2 tablet for 1 week, then increase to 1 tablet daily 30 tablet 0    nicotine (NICODERM CQ) 21 MG/24HR 24 hr patch Place 1 patch onto the skin every 24 hours 28 patch 1    senna-docusate (SENOKOT-S/PERICOLACE) 8.6-50  MG tablet Take 2 tablets by mouth daily as needed for constipation       No current facility-administered medications for this encounter.     Facility-Administered Medications Ordered in Other Encounters   Medication Dose Route Frequency Provider Last Rate Last Admin    Self Administer Medications: Behavioral Services   Does not apply See Admin Instructions Brenda Negrete MD         Medication Prescriber:  RICARDO Morrell CNP  Taking meds as prescribed? Yes  Medication side effects or concerns:  None reported  Outside medical appointments this week (list provider and reason for visit):  Patient met with Addiction Medicine provider on 24    Narrative:   Patient attended weekend RN lecture on HIV/AIDS.  Patient met with Addcition Medicine provider for initial visit for addiction consultation and management referred by LP due to concerns for Alcohol Use Disorder (AUD) and Stimulant Use Disorder.     Dimension 3: Emotional/Behavioral Conditions & Complications -   Previous Dimension Ratin  Current Dimension Ratin    PHQ2:       2024    11:00 AM   PHQ-2 (  [employer])   Q1: Little interest or pleasure in doing things 0   Q2: Feeling down, depressed or hopeless 0   PHQ-2 Score 0      GAD2:       2024    11:00 AM   ESTEPHANIE-2   Feeling nervous, anxious, or on edge 2   Not being able to stop or control worrying 0   ESTEPHANIE-2 Total Score 2     C-SSRS: Failed to redirect to the Timeline version of the REVFS SmartLink.    Mental health diagnosis: No Formal Dx  Date of last SIB:  NA  Date of  last SI:  NA  Date of last HI: NA  Behavioral Targets:  Substance Abuse, Impulsivity, Lack of Emotional Regulation, Lack of Coping Skills, Lack of awareness of personal MH concerns.   Risk factors:  Long Term Substance Abuse  Protective factors:  spirituality, regular sleep, agreement to use safety plan, living with other people, daily obligations, and structured day  Current MH Assignments:  Creating New  "Beliefs    Narrative:  Current Mental Health symptoms include: Anxious, Stress. Patient continues to work on feeling identification in small group therapy. Patient reported adjusting into treatment and feeling more at east each day. Patient reported, \" I feel more clear minded every day I'm here.\" Patient met with staff therapist for psychotherapy session. Patient plans to return to therapy next week. Patient reported her stress level has decreased due to, admitting to treatment, and starting to process what lead her to treatment.     Dimension 4: Treatment Acceptance / Resistance -   Previous Dimension Ratin  Current Dimension Ratin  KYARA Diagnosis:    Alcohol Use Disorder Severe F10.20  Stimulant Use Disorder Severe F14.20  Cannabis Use Disorder Mild F12.20  KAYRA assignments - Step One    Narrative -   Patient reports their motivation this week is \"myself, just me, and getting better.\"  Patient continues to attend assigned groups. Patient remains engaged in groups, and has started speaking up without staff prompting. Patient reported working on assignments and will be presenting next week. Patient participated in the spirituality group, facilitated by Carmen Lea and  counseling staff was present during group.     Dimension 5: Relapse / Continued Problem Potential -   Previous Dimension Ratin  Current Dimension Ratin  Relapses this week - None  Urges to use -  Yes  UA results -   5/3/24- Positive Cocaine  5/10/24-- Negative All    Narrative-  Pt rated her cravings to use this week at 1/10. Patient reported still needing to gain skills to address intense emotions/situations. Patient reported she mariaa with cravings by taking her medications and focusing on her personal strength and power of mind.. Patient engaged in discussions in group addressing relapse prevention strategies, and exploring past patterns of use.     Dimension 6: Recovery Environment -   Previous Dimension Rating:  " 4  Current Dimension Ratin    Family supportive of treatment?  Yes  Community support group attendance - daily in house lecture meetings  Recreational activities - walks, time outside    Narrative -   Pt is spending free time with same-gender peers and attending daily 12 step lecture meetings daily,  while in [facility]. Patient participated in weekend workshop on relationships and engaged in all activities. Patient expressed ideas for aftercare this past week. Patient met with counselor to review options for aftercare and discuss the need for sober living.  Patient and counselor will continue to meet and establish aftercare plans.     Progress made on transition planning goals:  Initial week, admission paperwork completed, treatment plan assignments given.     Justification for Continued Treatment at this Level of Care:  Patient has minimal time abstinent from substances. Patient lacks awareness of addiction and has not been willing till now to explore change. Patient is struggling with denial of illness, and lacks understanding of relapse and is at high risk for going back to use. Patient lacks prevention skills and has not been able to implement coping skills to avoid use, and prevent further consequences. Patient does not have a safe and stable housing situation secured that will support and promote progress in their recovery.      Treatment coordination activities this week:   none at this time  Need for peer recovery support referral? No      Treatment coordination activities this week:   None this week    Need for peer recovery support referral? No      Discharge Planning:  Target Discharge Date/Timeframe:  24   Med Mgmt Provider/Appt:  SILVIA   Ind therapy Provider/Appt:  SILVIA      Has vulnerable adult status change? No    Interdisciplinary Clinical Supervision including: CANDIE, LMFT, RN's and Supervisor/Manager    Are Treatment Plan goals/objectives effective?  Yes  *If no, list changes to treatment plan:    Are  the current goals meeting client's needs?  Yes  *If no, list the changes to treatment plan.    Client Input / Response: Patient is involved to the creation of weekly treatment plan reviews.     *Client agrees with any changes to the treatment plan: Yes  *Client received copy of changes: N/A  *Client is aware of right to access a treatment plan review: Yes

## 2024-05-14 NOTE — GROUP NOTE
Group Therapy Documentation    PATIENT'S NAME: Alejandrina Clement  MRN:   [id number]  :   1965  ACCT. NUMBER: [id number]  DATE OF SERVICE: 24  START TIME:  3:00 PM  END TIME:  4:00 PM  FACILITATOR(S): Dinorah Bryant LADC; Fanny Sanford LADC  TOPIC: BEH Group Therapy  Number of patients attending the group:  24  Group Length:  2 Hours    Group Therapy Type: Daily living/independence skills    Summary of Group / Topics Discussed:    Relationship/socialization and Balanced lifestyle    Group Attendance:  Attended group session    Patient's response to the group topic/interactions:  cooperative with task    Patient appeared to be Actively participating, Attentive, and Engaged.        Client specific details: Pt took part in an activity illustrating the importance of communication and being a part of a group.

## 2024-05-14 NOTE — GROUP NOTE
Group Therapy Documentation    PATIENT'S NAME: Alejandrina Clement  MRN:   [id number]  :   1965  ACCT. NUMBER: [id number]  DATE OF SERVICE: 24  START TIME: 12:30 PM  END TIME:  2:30 PM  FACILITATOR(S): Opal Becerril LADC; aCrmen Pa  TOPIC: BEH Group Therapy  Number of patients attending the group:  7  Group Length:  2 Hours    Group Therapy Type: Recovery strategies, Emotion processing, Health and wellbeing , and spirituality    Summary of Group / Topics Discussed:    Spiritual Care, Relationship/socialization, and Mindfulness/Relaxation  Spiritual care facilitated by unit  Carmen Pa.      Group Attendance:  Attended group session    Patient's response to the group topic/interactions:  cooperative with task    Patient appeared to be Engaged.        Client specific details:  Patient shared appropriate feedbacks during group.

## 2024-05-14 NOTE — GROUP NOTE
"Group Therapy Documentation    PATIENT'S NAME: Alejandrina Clement  MRN:   [id number]  :   1965  ACCT. NUMBER: [id number]  DATE OF SERVICE: 24  START TIME:  9:00 AM  END TIME: 11:00 AM  FACILITATOR(S): Dinorah Bryant LADC  TOPIC: BEH Group Therapy  Number of patients attending the group:  7  Group Length:  2 Hours    Group Therapy Type: Recovery strategies and Emotion processing    Summary of Group / Topics Discussed:    Relationship/socialization and Relapse prevention    Group Attendance:  Attended group session    Patient's response to the group topic/interactions:  cooperative with task    Patient appeared to be Actively participating, Attentive, and Engaged.        Client specific details: Pt reported feeling peaceful and strong, and offered supportive feedback on her peer's \"Relapse Prevention Plan.\" She took part in a group conversation about healthy vs. Unhealthy relationships, and supported a peer who asked for help with a difficult decision.  "

## 2024-05-15 ENCOUNTER — HOSPITAL ENCOUNTER (OUTPATIENT)
Dept: BEHAVIORAL HEALTH | Facility: CLINIC | Age: 59
Discharge: HOME | End: 2024-05-15
Attending: FAMILY MEDICINE
Payer: COMMERCIAL

## 2024-05-15 ENCOUNTER — OFFICE VISIT (OUTPATIENT)
Dept: PSYCHIATRY | Facility: CLINIC | Age: 59
End: 2024-05-15
Attending: PSYCHIATRY & NEUROLOGY
Payer: COMMERCIAL

## 2024-05-15 VITALS
WEIGHT: 125.2 LBS | SYSTOLIC BLOOD PRESSURE: 128 MMHG | BODY MASS INDEX: 20.21 KG/M2 | DIASTOLIC BLOOD PRESSURE: 82 MMHG | HEART RATE: 71 BPM

## 2024-05-15 DIAGNOSIS — F33.40 MAJOR DEPRESSIVE DISORDER, RECURRENT, IN REMISSION (H): ICD-10-CM

## 2024-05-15 DIAGNOSIS — F15.20 SEVERE STIMULANT USE DISORDER (H): ICD-10-CM

## 2024-05-15 DIAGNOSIS — F10.20 ALCOHOL USE DISORDER, SEVERE, DEPENDENCE (H): Primary | ICD-10-CM

## 2024-05-15 DIAGNOSIS — F17.200 TOBACCO USE DISORDER: ICD-10-CM

## 2024-05-15 DIAGNOSIS — F10.982 ALCOHOL-INDUCED INSOMNIA (H): ICD-10-CM

## 2024-05-15 PROCEDURE — H2035 A/D TX PROGRAM, PER HOUR: HCPCS | Mod: HQ

## 2024-05-15 PROCEDURE — 1002N00001 HC LODGING PLUS FACILITY CHARGE ADULT

## 2024-05-15 PROCEDURE — 90792 PSYCH DIAG EVAL W/MED SRVCS: CPT | Mod: GC | Performed by: STUDENT IN AN ORGANIZED HEALTH CARE EDUCATION/TRAINING PROGRAM

## 2024-05-15 RX ORDER — GABAPENTIN 300 MG/1
300 CAPSULE ORAL 3 TIMES DAILY
Qty: 90 CAPSULE | Refills: 0 | Status: SHIPPED | OUTPATIENT
Start: 2024-05-15 | End: 2024-05-24

## 2024-05-15 RX ORDER — DOXEPIN HYDROCHLORIDE 10 MG/1
10 CAPSULE ORAL AT BEDTIME
Qty: 30 CAPSULE | Refills: 0 | Status: SHIPPED | OUTPATIENT
Start: 2024-05-15 | End: 2024-05-23

## 2024-05-15 ASSESSMENT — PATIENT HEALTH QUESTIONNAIRE - PHQ9
SUM OF ALL RESPONSES TO PHQ QUESTIONS 1-9: 7
10. IF YOU CHECKED OFF ANY PROBLEMS, HOW DIFFICULT HAVE THESE PROBLEMS MADE IT FOR YOU TO DO YOUR WORK, TAKE CARE OF THINGS AT HOME, OR GET ALONG WITH OTHER PEOPLE: VERY DIFFICULT
SUM OF ALL RESPONSES TO PHQ QUESTIONS 1-9: 7

## 2024-05-15 ASSESSMENT — PAIN SCALES - GENERAL: PAINLEVEL: NO PAIN (0)

## 2024-05-15 NOTE — GROUP NOTE
Group Therapy Documentation    PATIENT'S NAME: Alejandrina Clement  MRN:   [id number]  :   1965  ACCT. NUMBER: [id number]  DATE OF SERVICE: 5/15/24  START TIME:  9:45 AM  END TIME: 11:30 AM  FACILITATOR(S): Opal Becerril LADC  TOPIC: BEH Group Therapy  Number of patients attending the group:  7  Group Length:  2 Hours    Group Therapy Type: Recovery strategies and Emotion processing    Summary of Group / Topics Discussed:  Patients discussed life lessons, phrases, quotes that have been helpful in making changes or progress throughout graduation for a peer.  Patients engaged in reading and processing daily meditations.    Facilitator : CHRIS Pineda Provided Mental Health Group Therapy  .  What is PTSD - CEE Fact Sheet    Patients reviewed: (Symptoms, Diagnosis, Treatment, Medications, Therapy, Trauma Therapy  Mindfulness, and Meditation. Adverse Childhood Experiences (ACES): Impact on childhood Trauma on Adult Wellbeing  Background of ACEs], Impact of ACEs, Adult [facility]  Finding your ACE Score, ACES Self-Care Tool for Adults.      Group Attendance:  Attended group session    Patient's response to the group topic/interactions:  cooperative with task    Patient appeared to be Engaged.        Client specific details:  Patient attended first session and participated in the activity. Patient excused during latter part of group for a medical appointment.
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
## 2024-05-15 NOTE — GROUP NOTE
"Group Therapy Documentation    PATIENT'S NAME: Alejandrina Clement  MRN:   [id number]  :   1965  ACCT. NUMBER: [id number]  DATE OF SERVICE: 5/15/24  START TIME: 12:30 PM  END TIME:  2:30 PM  FACILITATOR(S): Fanny Sanford LADC  TOPIC: BEH Group Therapy  Number of patients attending the group:  8  Group Length:  2 Hours    Group Therapy Type: Recovery strategies    Summary of Group / Topics Discussed:    Recovery Principles    Patients completed daily check ins and the question of the day    Patients presented assignments and shared feedback.     Patients engaged in reading and processing daily meditations.    Patients engaged in completing a recipe card for a \"Stable Successful Recovery\"   Patients listed ingredients that are all essential in sustaining such a recovery.   Patients engaged in discussion on why these ingredients are important and the roles they play in the overall process of creating something that is \"successful.\"         Group Attendance:  Attended group session    Patient's response to the group topic/interactions:  cooperative with task    Patient appeared to be Actively participating.        Client specific details:  More Attended small group therapy. Patient engaged in discussion and participated in sharing feedback to their peers.   .    "

## 2024-05-15 NOTE — GROUP NOTE
Psychoeducation Group Documentation    PATIENT'S NAME: Alejandrina Clement  MRN:   [id number]  :   1965  ACCT. NUMBER: [id number]  DATE OF SERVICE: 5/15/24  START TIME:  8:30 AM  END TIME:  9:30 AM  FACILITATOR(S): Thomas Ryder LADC; Duke Fields MD; Opal Becerril LADC  TOPIC: BEH Pyschoeducation  Number of patients attending the group:  25  Group Length:  1 Hours    Skills Group Therapy Type: Recovery skills and Emotion regulation skills    Summary of Group / Topics Discussed:    Balanced lifestyle skills and Symptom management skills        Group Attendance:  Attended group session    Patient's response to the group topic/interactions:  cooperative with task and listened actively    Patient appeared to be Attentive and Engaged.         Client specific details:  Alejandrina gave appropriate feedback. .

## 2024-05-15 NOTE — NURSING NOTE
Chief Complaint   Patient presents with    Eval/Assessment     Addiction     Blood pressure 128/82, pulse 71, weight 56.8 kg (125 lb 3.2 oz).    - Antonio Mckoy, Visit Facilitator

## 2024-05-16 ENCOUNTER — HOSPITAL ENCOUNTER (OUTPATIENT)
Dept: BEHAVIORAL HEALTH | Facility: CLINIC | Age: 59
Discharge: HOME | End: 2024-05-16
Attending: FAMILY MEDICINE
Payer: COMMERCIAL

## 2024-05-16 PROCEDURE — 1002N00001 HC LODGING PLUS FACILITY CHARGE ADULT

## 2024-05-16 PROCEDURE — H2035 A/D TX PROGRAM, PER HOUR: HCPCS | Mod: HQ

## 2024-05-16 PROCEDURE — H2035 A/D TX PROGRAM, PER HOUR: HCPCS

## 2024-05-16 NOTE — GROUP NOTE
Group Therapy Documentation    PATIENT'S NAME: Alejandrina Clement  MRN:   [id number]  :   1965  ACCT. NUMBER: [id number]  DATE OF SERVICE: 24  START TIME:  10:00 AM  END TIME: 11:00 AM  FACILITATOR(S): Anthony Wooten LADC  TOPIC: BEH Group Therapy  Number of patients attending the group:  7  Group Length:  1 Hour    Group Therapy Type: Recovery strategies    Summary of Group / Topics Discussed:    Recovery Principles, Mindfulness/Relaxation, Coping/DBT informed care, Trauma informed care, Disease of addiction, and Emotions/expression      Group Attendance:  Attended group session    Patient's response to the group topic/interactions:  cooperative with task    Patient appeared to be Attentive and Engaged.        Client specific details:  Alejandrina missed the first part of group while she was meeting with a [facility] therapist. For the second part of group, she took part in an activity related to positive self-talk and affirmations. This was followed by a presentation from each group member and a discussion.

## 2024-05-16 NOTE — GROUP NOTE
Group Therapy Documentation    PATIENT'S NAME: Alejandrina Clement  MRN:   [id number]  :   1965  ACCT. NUMBER: [id number]  DATE OF SERVICE: 24  START TIME: 12:30 PM  END TIME:  2:30 PM  FACILITATOR(S): Opal Becerril LADC  TOPIC: BEH Group Therapy  Number of patients attending the group:  7  Group Length:  2 Hours    Group Therapy Type: Recovery strategies and Health and wellbeing     Summary of Group / Topics Discussed:    Recovery Principles, Relationship/socialization, Balanced lifestyle, Mindfulness/Relaxation, and Leisure explorations/use of leisure time      Group Attendance:  Attended group session    Patient's response to the group topic/interactions:  cooperative with task    Patient appeared to be Engaged.        Client specific details:  Patient shared feeling pleased and shared an affirmation to self. Patient participated in the 5v5 project activity..

## 2024-05-16 NOTE — PROGRESS NOTES
"INDIVIDUAL SESSION SUMMARY    D) Met with client on 5/16/24 from 9:00-9:45am. Client is in the [facility] program. Client shared that recent medication changes have been helping reduce her anxiety and improve her sleep. Client discussed a ritual she did in spirituality group recently where she wrote a letter to her dad, and the support she received from her peers. Client reported to find a sober home and is \"pleased\" with her plan to start IOP. Client spoke of hobbies that she intends to resume while also going to AA and making sober friends. Client stated \"I am excited and proud of myself\".     I)  Provided client with verbal interventions including validation, compassion, and support, Provided positive reinforcement for progress towards goals, gains in insight, and application of skills, Discussed the importance of recovery behaviors such as forming/utilizing a sober support network, daily rituals, goal setting, and identifying relapse warning signs, and Discussed healthy boundaries, positive self-talk, self-compassion, and self-trust.    A)   Client appears to be gaining insights into their emotions and using healthy coping skills for managing stress, Client appears to be forming relationships with their peers, Client appears to understand the importance of a sober support network, and Client appears motivated to seek a new daily routine, meaningful activities, and a sense of purpose.    P) Next session is scheduled for 5/23/24.     Lilly Clarke, CHRIS  5/16/2024    "

## 2024-05-16 NOTE — GROUP NOTE
Psychoeducation Group Documentation    PATIENT'S NAME: Alejandrina Clement  MRN:   [id number]  :   1965  ACCT. NUMBER: [id number]  DATE OF SERVICE: 24  START TIME:  3:00 PM  END TIME:  4:00 PM  FACILITATOR(S): Elizabeth Wall LADC; Jerry Shah LADC  TOPIC: BEH Pyschoeducation  Number of patients attending the group:  6  Group Length:  1 Hours    Skills Group Therapy Type: Recovery skills, Emotion regulation skills, Daily living/independence skills, and Healthy behaviors development    Summary of Group / Topics Discussed:    Relationship/social skills, Balanced lifestyle skills, Mindfulness/Relaxation skills, and Symptom management skills.    Skills group was presented by: Dr. Werner on  Dual Diagnosis . The patients were engaged in Q&A during after presentation and resources were provided for future assistance that would be beneficial and can assist in abstinence, goals, and to help support recovery.        Group Attendance:  Attended group session    Patient's response to the group topic/interactions:  cooperative with task and listened actively    Patient appeared to be Engaged.         Client specific details:  More actively participated in skill discussion.

## 2024-05-17 ENCOUNTER — HOSPITAL ENCOUNTER (OUTPATIENT)
Dept: BEHAVIORAL HEALTH | Facility: CLINIC | Age: 59
Discharge: HOME | End: 2024-05-17
Attending: FAMILY MEDICINE
Payer: COMMERCIAL

## 2024-05-17 PROCEDURE — 1002N00001 HC LODGING PLUS FACILITY CHARGE ADULT

## 2024-05-17 PROCEDURE — H2035 A/D TX PROGRAM, PER HOUR: HCPCS | Mod: HQ

## 2024-05-17 NOTE — GROUP NOTE
Group Therapy Documentation    PATIENT'S NAME: Alejandrina Clement  MRN:   [id number]  :   1965  ACCT. NUMBER: [id number]  DATE OF SERVICE: 24  START TIME:  9:00 AM  END TIME: 11:00 AM  FACILITATOR(S): Opal Becerril LADC  TOPIC: BEH Group Therapy  Number of patients attending the group:  7  Group Length:  2 Hours    Group Therapy Type: Recovery strategies and Emotion processing    Summary of Group / Topics Discussed:    Recovery Principles, Mindfulness/Relaxation, and Emotions/expression  Daily check-in, reflection reading and discussion, assignment processing and feedbacks, ended session with the serenity prayer.      Group Attendance:  Attended group session    Patient's response to the group topic/interactions:  cooperative with task    Patient appeared to be Engaged.        Client specific details:  Patient shared feeling happy and proud. Patient shared appropriate feedbacks during group discussion

## 2024-05-17 NOTE — GROUP NOTE
Group Therapy Documentation    PATIENT'S NAME: Alejandrina Clement  MRN:   [id number]  :   1965  ACCT. NUMBER: [id number]  DATE OF SERVICE: 24  START TIME: 12:30 PM  END TIME:  2:30 PM  FACILITATOR(S): Fanny aSnford LADC  TOPIC: BEH Group Therapy  Number of patients attending the group:  7  Group Length:  2 Hours    Group Therapy Type: Recovery strategies    Summary of Group / Topics Discussed:    Recovery Principles    Patients viewed an addiction/mental health based film. This film addressed: addiction as a disease, relationships and addiction, engagement in AA, and evaluating priorities when getting sober.   Patients engaged in a thorough discussion about the film, and shared personal experiences, and how the film helped them process their own life events.       Group Attendance:  Attended group session    Patient's response to the group topic/interactions:  cooperative with task    Patient appeared to be Actively participating.        Client specific details:  Alejandrina Attended small group therapy. Patient engaged in discussion and participated in sharing feedback to their peers.   .

## 2024-05-18 ENCOUNTER — HOSPITAL ENCOUNTER (OUTPATIENT)
Dept: BEHAVIORAL HEALTH | Facility: CLINIC | Age: 59
Discharge: HOME | End: 2024-05-18
Attending: FAMILY MEDICINE
Payer: COMMERCIAL

## 2024-05-18 PROCEDURE — H2035 A/D TX PROGRAM, PER HOUR: HCPCS | Mod: HQ

## 2024-05-18 PROCEDURE — 1002N00001 HC LODGING PLUS FACILITY CHARGE ADULT

## 2024-05-18 NOTE — GROUP NOTE
Group Therapy Documentation    PATIENT'S NAME: Alejandrina Clement  MRN:   [id number]  :   1965  ACCT. NUMBER: [id number]  DATE OF SERVICE: 24  START TIME:  9:00 AM  END TIME: 11:00 AM  FACILITATOR(S): Meagan Maria LADC; Opal Becerril LADC; Adolph Aleman LADC  TOPIC: BEH Group Therapy  Number of patients attending the group:  24  Group Length:  2 Hours    Group Therapy Type: Recovery strategies    Summary of Group / Topics Discussed:    Relationship/socialization      Group Attendance:  Attended group session    Patient's response to the group topic/interactions:  cooperative with task    Patient appeared to be Actively participating, Attentive, and Engaged.        Client specific details: Alejandrina was an active participant in weekend Relationships Workshop on setting and maintaining healthy boundaries in recovery.

## 2024-05-18 NOTE — GROUP NOTE
Group Therapy Documentation    PATIENT'S NAME: Alejandrina Clement  MRN:   [id number]  :   1965  ACCT. NUMBER: [id number]  DATE OF SERVICE: 24  START TIME: 12:30 PM  END TIME:  2:30 PM  FACILITATOR(S): Opal Becerril LADC; Meagan Maria LADC  TOPIC: BEH Group Therapy  Number of patients attending the group:  24  Group Length:  2 Hours    Group Therapy Type: Emotion processing and Daily living/independence skills    Summary of Group / Topics Discussed:    Relationship/socialization and Emotions/expression  Discussion and feedbacks. Distinguishing who are supportive, neutral and destructive persons in your recovery. Group activity and discussion.      Group Attendance:  Attended group session    Patient's response to the group topic/interactions:  cooperative with task    Patient appeared to be Engaged.        Client specific details:  Patient shared appropriate feedbacks and participated in the group activity.

## 2024-05-19 ENCOUNTER — HOSPITAL ENCOUNTER (OUTPATIENT)
Dept: BEHAVIORAL HEALTH | Facility: CLINIC | Age: 59
Discharge: HOME | End: 2024-05-19
Attending: FAMILY MEDICINE
Payer: COMMERCIAL

## 2024-05-19 PROCEDURE — H2035 A/D TX PROGRAM, PER HOUR: HCPCS | Mod: HQ

## 2024-05-19 PROCEDURE — 1002N00001 HC LODGING PLUS FACILITY CHARGE ADULT

## 2024-05-19 NOTE — GROUP NOTE
Group Therapy Documentation    PATIENT'S NAME: Alejandrina Clement  MRN:   [id number]  :   1965  ACCT. NUMBER: [id number]  DATE OF SERVICE: 24  START TIME:  8:45 AM  END TIME: 10:30 AM  FACILITATOR(S): Meagan Maria LADC; Theresa Wallace RN; Adolph Aleman LADC  TOPIC: BEH Group Therapy  Number of patients attending the group:  24  Group Length:  2 Hours    Group Therapy Type: Health and wellbeing     Summary of Group / Topics Discussed:    Hepatitis C      Group Attendance:  Attended group session    Patient's response to the group topic/interactions:  cooperative with task    Patient appeared to be Attentive and Engaged.        Client specific details: Alejandrina was an active participant in [facility] RN lecture on Hepatitis C, B, and A as well as a laughter yoga exercise.

## 2024-05-19 NOTE — GROUP NOTE
Psychoeducation Group Documentation    PATIENT'S NAME: Alejandrian Clement  MRN:   [id number]  :   1965  ACCT. NUMBER: [id number]  DATE OF SERVICE: 24  START TIME: 12:30 AM  END TIME:  1:30 PM  FACILITATOR(S): Adolph Aleman LADC  TOPIC: BEH Pyschoeducation  Number of patients attending the group:  24  Group Length:  1 Hours    Skills Group Therapy Type: Relationship skills development    Summary of Group / Topics Discussed:    Relationship/social skills        Group Attendance:  Attended group session    Patient's response to the group topic/interactions:  cooperative with task and listened actively    Patient appeared to be Attentive and Engaged.         Client specific details:  Pt was attentive and engaged.

## 2024-05-20 ENCOUNTER — HOSPITAL ENCOUNTER (OUTPATIENT)
Dept: BEHAVIORAL HEALTH | Facility: CLINIC | Age: 59
Discharge: HOME | End: 2024-05-20
Attending: FAMILY MEDICINE
Payer: COMMERCIAL

## 2024-05-20 PROCEDURE — H2035 A/D TX PROGRAM, PER HOUR: HCPCS | Mod: HQ

## 2024-05-20 PROCEDURE — 1002N00001 HC LODGING PLUS FACILITY CHARGE ADULT

## 2024-05-20 ASSESSMENT — ANXIETY QUESTIONNAIRES
7. FEELING AFRAID AS IF SOMETHING AWFUL MIGHT HAPPEN: NOT AT ALL
2. FELT ANXIOUS, WORRIED, OR NERVOUS: MORE THAN HALF THE DAYS
5. BEING SO RESTLESS THAT IT IS HARD TO SIT STILL: SEVERAL DAYS
3. WORRYING TOO MUCH ABOUT DIFFERENT THINGS: SEVERAL DAYS
GAD7 TOTAL SCORE: 6
2. NOT BEING ABLE TO STOP OR CONTROL WORRYING: MORE THAN HALF THE DAYS
4. TROUBLE RELAXING: NOT AT ALL
6. BECOMING EASILY ANNOYED OR IRRITABLE: NOT AT ALL

## 2024-05-20 ASSESSMENT — PATIENT HEALTH QUESTIONNAIRE - PHQ9: SUM OF ALL RESPONSES TO PHQ QUESTIONS 1-9: 7

## 2024-05-20 NOTE — GROUP NOTE
"Group Therapy Documentation    PATIENT'S NAME: Alejandrina Clement  MRN:   [id number]  :   1965  ACCT. NUMBER: [id number]  DATE OF SERVICE: 24  START TIME: 12:30 PM  END TIME:  2:30 PM  FACILITATOR(S): Dinorah Bryant LADC  TOPIC: BEH Group Therapy  Number of patients attending the group:  5  Group Length:  2 Hours    Group Therapy Type: Emotion processing    Summary of Group / Topics Discussed:    Cognitive behavioral therapy skills and Coping/DBT informed care    Group Attendance:  Attended group session    Patient's response to the group topic/interactions:  cooperative with task    Patient appeared to be Actively participating, Attentive, and Engaged.        Client specific details:  Pt offered supportive feedback on her peer's \"Relapse Triggers and Cues\" assignment, took part in a group discussion of the \"opposite action\" DBT skill, and participated in activities utilizing the \"examine the evidence\" CBT skill.  "

## 2024-05-20 NOTE — PROGRESS NOTES
[facility] Weekly Treatment Plan Review      Date span:  24 to 24    Patient did have any absences during this time period (list absence dates and reason for absence). She was excused from half of morning group on 5-15-24 for an appt.    Treatment Plan initiated on: 24.    Dimension1: Acute Intoxication/Withdrawal Potential -   Previous Dimension Ratin  Current Dimension Ratin  Date of Last Use: 24  Any reports of withdrawal symptoms - No    Dimension 2: Biomedical Conditions & Complications -   Previous Dimension Ratin  Current Dimension Ratin  Medical Concerns:  None reported.  Current Medications & Medication Changes:  Current Outpatient Medications   Medication Sig Dispense Refill    acetaminophen (TYLENOL) 500 MG tablet Take 500-1,000 mg by mouth every 8 hours as needed for mild pain      alum & mag hydroxide-simethicone (MAALOX) 200-200-20 MG/5ML SUSP suspension Take 30 mLs by mouth every 6 hours as needed for indigestion      benzocaine-menthol (CEPACOL) 15-3.6 MG lozenge Place 1 lozenge inside cheek every 2 hours as needed for sore throat      doxepin (SINEQUAN) 10 MG capsule Take 1 capsule (10 mg) by mouth at bedtime 30 capsule 0    escitalopram (LEXAPRO) 10 MG tablet Take 1 tablet (10 mg) by mouth daily      gabapentin (NEURONTIN) 300 MG capsule Take 1 capsule (300 mg) by mouth 3 times daily 90 capsule 0    guaiFENesin-dextromethorphan (ROBITUSSIN DM) 100-10 MG/5ML syrup Take 10 mLs by mouth 4 times daily as needed for cough      ibuprofen (ADVIL/MOTRIN) 200 MG tablet Take 600 mg by mouth every 6 hours as needed for pain      loratadine (CLARITIN) 10 MG tablet Take 10 mg by mouth daily      melatonin 5 MG tablet Take 5 mg by mouth nightly as needed for sleep      naltrexone (DEPADE/REVIA) 50 MG tablet Take 1/2 tablet for 1 week, then increase to 1 tablet daily 30 tablet 0    nicotine (NICODERM CQ) 21 MG/24HR 24 hr patch Place 1 patch onto the skin every 24  hours 28 patch 1    senna-docusate (SENOKOT-S/PERICOLACE) 8.6-50 MG tablet Take 2 tablets by mouth daily as needed for constipation       No current facility-administered medications for this encounter.     Facility-Administered Medications Ordered in Other Encounters   Medication Dose Route Frequency Provider Last Rate Last Admin    Self Administer Medications: Behavioral Services   Does not apply See Admin Instructions Brenda Negrete MD         Medication Prescriber: See chart.  Taking meds as prescribed? Yes  Medication side effects or concerns:  None reported.  Outside medical appointments this week (list provider and reason for visit):  Pt was seen in psychiatry on 5-15-24 and for an ultrasound on 5-15*24.    Narrative: Pt seems fully functioning and seeks medical attention as needed. She attended lecture given by LP nurse on Hep C on 24.     Dimension 3: Emotional/Behavioral Conditions & Complications -   Previous Dimension Ratin  Current Dimension Ratin  PHQ2:       2024     3:00 PM 2024    11:00 AM   PHQ-2 (  [employer])   Q1: Little interest or pleasure in doing things 2 0   Q2: Feeling down, depressed or hopeless 2 0   PHQ-2 Score 4 0      GAD2:       2024    11:00 AM 5/15/2024     8:32 AM 2024     3:00 PM   ESTEPHANIE-2   Feeling nervous, anxious, or on edge 2 1 2   Not being able to stop or control worrying 0 1 2   ESTEPHANIE-2 Total Score 2 2 4     Mental health diagnosis No Formal Dx   Date of last SIB:  Patient denies.  Date of  last SI:  Patient denies.  Date of last HI: Patient denies.  Behavioral Targets: Follow recommendations of medical provider. Report any alcohol or drug use to counselor and any increase in withdrawal symptoms to nurse. Stabilize and maintain mental health.   Risk factors: The patient lacks relapse prevention, coping, and refusal skills, as well as a sober peer support network. She has dual issues of MI and CD, a tendency to isolate, and a significant  "history of trauma, abuse, shame, grief, and loss issues.  Protective factors:  spirituality, abstinence from substances, adherence with prescribed medication, agreement to use safety plan, living with other people, structured day, positive social skills, and access to a variety of clinical interventions  Current MH Assignments:  \"Grief and Loss,\"  Correcting Distorted Thinking     Narrative: Current Mental Health symptoms include: none reported or observed. See below for suicide risk assessment. Pt does not endorse thoughts of self-harm or suicide ideation at this time.  She reports that her stress level has decreased; coping skills to manage stress used were \"my medication.\" Pt reported that her mood has not significantly changed this past week.    Almond Suicide Severity Rating Scale (Short Version)      8/18/2020    10:46 AM 8/23/2020     8:13 PM 9/6/2020     7:22 PM 5/2/2024     9:46 AM 5/2/2024    11:55 AM 5/3/2024    10:52 AM 5/3/2024     4:00 PM   Almond Suicide Severity Rating (Short Version)   Over the past 2 weeks have you felt down, depressed, or hopeless? yes no no       Over the past 2 weeks have you had thoughts of killing yourself? no no no       Have you ever attempted to kill yourself? no no no       Q1 Wished to be Dead (Past Month)    1-->yes 1-->yes  1-->yes   Q2 Suicidal Thoughts (Past Month)    1-->yes 1-->yes  1-->yes   Q3 Suicidal Thought Method    0-->no 1-->yes  1-->yes   Q4 Suicidal Intent without Specific Plan    0-->no 1-->yes  0-->no   Q5 Suicide Intent with Specific Plan    0-->no 0-->no  0-->no   Q6 Suicide Behavior (Lifetime)    0-->no   1-->yes   Within the Past 3 Months?       0-->no   Level of Risk per Screen    low risk high risk  moderate risk   1. Wish to be Dead (Since Last Contact)      N    2. Non-Specific Active Suicidal Thoughts (Since Last Contact)      N    Actual Attempt (Since Last Contact)      N    Has subject engaged in non-suicidal self-injurious behavior? " "(Since Last Contact)      N    Interrupted Attempts (Since Last Contact)      N    Aborted or Self-Interrupted Attempt (Since Last Contact)      N    Preparatory Acts or Behavior (Since Last Contact)      N    Suicide (Since Last Contact)      N    Calculated C-SSRS Risk Score (Since Last Contact)      No Risk Indicated        Dimension 4: Treatment Acceptance / Resistance -   Previous Dimension Ratin  Current Dimension Ratin  KYARA Diagnosis: Alcohol Use Disorder Severe F10.20  Stimulant Use Disorder Severe F14.20  Cannabis Use Disorder Mild F12.20  Commitment to tx process/Stage of change- Pt consistently attends and participates in groups and lectures. She appears to be in the contemplative stage of change at this time.  KYARA assignments - \"Drug Use History\"    Narrative - Pt reports her motivation this week is \"I love my group.\" Pt reports that her aftercare plans include sober housing and AA. She reports that she has gotten along \"great\" with peers and staff this week.     Dimension 5: Relapse / Continued Problem Potential -   Previous Dimension Ratin  Current Dimension Ratin  Relapses this week - None  Urges to use - None  UA results - negative for all screened drugs    Narrative- Pt reports no cravings this past week. She reported not experiencing any triggers to use, but used the following coping skill(s): \"not thinking about it, reading or watching TV, staying active.\" Pt participated in the spirituality group, facilitated by Carmen Lea and  counseling staff was present during group.    Dimension 6: Recovery Environment -   Previous Dimension Ratin  Current Dimension Ratin  Family Involvement - n/a  Summarize attendance at family groups and family sessions - n/a  Family supportive of treatment?  Yes  Recreational activities - TV, going to the park    Narrative - Pt is spending free time with same-gender peers and attending at least 3 virtual 12-step meetings weekly while in " [place]. She reports having had contact with her mother, sister, and nephews this week. Pt participated in weekend workshop on relationships and completed all activities.    Progress made on transition planning goals: none at this time    Justification for Continued Treatment at this Level of Care: Risk ratings indicate continued need for this level of care. Pt has been unable to maintain abstinence from alcohol while at the outpatient level of care, lacks long-term sober maintenance skills, lacks sober coping skills and has medical and mental health concerns which are exacerbated by substance use.   Treatment coordination activities this week:   none at this time  Need for peer recovery support referral? No    Discharge Planning:  Target Discharge Date/Timeframe:  5-31-24  Med Mgmt Provider/Appt:  TBD  Ind therapy Provider/Appt:  TBD  Family therapy Provider/Appt:  TBD  Other referrals:  none at this time.    Has vulnerable adult status changed? No    Interdisciplinary Clinical Supervision including: CANDIE and RN    Are Treatment Plan goals/objectives effective? Yes  *If no, list changes to treatment plan:    Are the current goals meeting client's needs? Yes  *If no, list the changes to treatment plan.    Patient Input / Response: Pt contributed to treatment plan review.    *Client agrees with any changes to the treatment plan: N/A  *Client received copy of changes: N/A  *Client is aware of right to access a treatment plan review: Yes    CANDIE Marques

## 2024-05-20 NOTE — GROUP NOTE
Group Therapy Documentation    PATIENT'S NAME: Alejandrina Clement  MRN:   [id number]  :   1965  ACCT. NUMBER: [id number]  DATE OF SERVICE: 24  START TIME:  9:00 AM  END TIME: 11:00 AM  FACILITATOR(S): Opal Becerril LADC  TOPIC: BEH Group Therapy  Number of patients attending the group:  5  Group Length:  2 Hours    Group Therapy Type: Recovery strategies and Emotion processing    Summary of Group / Topics Discussed:    Recovery Principles, Relationship/socialization, Coping/DBT informed care, and Emotions/expression  Graduation for a peer, reflection reading and feedbacks, assignment processing, DBT skill - DEAR MAN.      Group Attendance:  Attended group session    Patient's response to the group topic/interactions:  cooperative with task    Patient appeared to be Engaged.        Client specific details:  Patient shared appropriate feedbacks during discussion. Patient participated in the individual/group worksheet activity.

## 2024-05-21 ENCOUNTER — HOSPITAL ENCOUNTER (OUTPATIENT)
Dept: BEHAVIORAL HEALTH | Facility: CLINIC | Age: 59
Discharge: HOME | End: 2024-05-21
Attending: FAMILY MEDICINE
Payer: COMMERCIAL

## 2024-05-21 ENCOUNTER — HOSPITAL ENCOUNTER (OUTPATIENT)
Dept: ULTRASOUND IMAGING | Facility: CLINIC | Age: 59
Discharge: HOME | End: 2024-05-21
Attending: NURSE PRACTITIONER | Admitting: NURSE PRACTITIONER
Payer: COMMERCIAL

## 2024-05-21 DIAGNOSIS — B18.2 CHRONIC HEPATITIS C (H): ICD-10-CM

## 2024-05-21 PROCEDURE — 1002N00001 HC LODGING PLUS FACILITY CHARGE ADULT

## 2024-05-21 PROCEDURE — 76705 ECHO EXAM OF ABDOMEN: CPT

## 2024-05-21 PROCEDURE — 76705 ECHO EXAM OF ABDOMEN: CPT | Mod: 26 | Performed by: RADIOLOGY

## 2024-05-21 PROCEDURE — H2035 A/D TX PROGRAM, PER HOUR: HCPCS | Mod: HQ

## 2024-05-21 NOTE — GROUP NOTE
Group Therapy Documentation    PATIENT'S NAME: Alejandrina Clement  MRN:   [id number]  :   1965  ACCT. NUMBER: [id number]  DATE OF SERVICE: 24  START TIME:  9:00 AM  END TIME: 11:00 AM  FACILITATOR(S): Dinorah Bryant LADC  TOPIC: BEH Group Therapy  Number of patients attending the group:  6  Group Length:  2 Hours    Group Therapy Type: Recovery strategies and Emotion processing    Summary of Group / Topics Discussed:    Sober coping skills, Emotions/expression, and Relapse prevention    Group Attendance:  Attended group session, excused from group session    Patient's response to the group topic/interactions:  cooperative with task    Patient appeared to be Actively participating, Attentive, and Engaged.        Client specific details: Pt reported feeling tired and shared that she is proud of not giving up. She offered support to a peer who was struggling with an issue outside of treatment. She was excused from half of group.

## 2024-05-21 NOTE — GROUP NOTE
Group Therapy Documentation    PATIENT'S NAME: Alejandrina Clement  MRN:   [id number]  :   1965  ACCT. NUMBER: [id number]  DATE OF SERVICE: 24  START TIME:  3:00 PM  END TIME:  4:00 PM  FACILITATOR(S): Dinorah Bryant LADC; Anthony Wooten LADC; Carolina Lee LADC  TOPIC: BEH Group Therapy  Number of patients attending the group:  23  Group Length:  2 Hours    Group Therapy Type: Recovery strategies    Summary of Group / Topics Discussed:    Recovery Principles and Emotions/expression    Group Attendance:  Attended group session    Patient's response to the group topic/interactions:  cooperative with task    Patient appeared to be Attentive and Engaged.        Client specific details: Pt listened respectfully to Maximilian RODRIGUEZ's presentation on recovery resources.

## 2024-05-21 NOTE — GROUP NOTE
Group Therapy Documentation    PATIENT'S NAME: Alejandrina Clement  MRN:   [id number]  :   1965  ACCT. NUMBER: [id number]  DATE OF SERVICE: 24  START TIME: 12:30 PM  END TIME:  2:30 PM  FACILITATOR(S): Fanny Sanford LADC; Carmen Pa  TOPIC: BEH Group Therapy  Number of patients attending the group:  6  Group Length:  2 Hours    Group Therapy Type: Recovery strategies    Summary of Group / Topics Discussed:    Recovery Principles    Spiritual Group Therapy consisted of Spiritual Care and addressing Principles that are important in recovery, and wellness of self. Patients and facilitators (Adams & LADC)  reviewed topics related to sense of self, identity, and relations to others within spirituality/relision/nonspiritual concepts.      Group Attendance:  Attended group session    Patient's response to the group topic/interactions:  cooperative with task    Patient appeared to be Actively participating.        Client specific details:  Alejandrina Attended spiritually focused small group therapy. Patient remained engaged, participated, and explored personal insights of spirituality in their recovery. No other concerns reproted  .

## 2024-05-22 ENCOUNTER — HOSPITAL ENCOUNTER (OUTPATIENT)
Dept: BEHAVIORAL HEALTH | Facility: CLINIC | Age: 59
Discharge: HOME | End: 2024-05-22
Attending: FAMILY MEDICINE
Payer: COMMERCIAL

## 2024-05-22 PROCEDURE — H2035 A/D TX PROGRAM, PER HOUR: HCPCS | Mod: HQ

## 2024-05-22 PROCEDURE — 1002N00001 HC LODGING PLUS FACILITY CHARGE ADULT

## 2024-05-22 NOTE — GROUP NOTE
"Group Therapy Documentation    PATIENT'S NAME: Alejandrina Clement  MRN:   [id number]  :   1965  ACCT. NUMBER: [id number]  DATE OF SERVICE: 24  START TIME:  9:45 AM  END TIME: 11:30 AM  FACILITATOR(S): Opal Becerril LADC; Lilly Clarke  TOPIC: BEH Group Therapy  Number of patients attending the group:  6  Group Length:  2 Hours    Group Therapy Type: Recovery strategies, Emotion processing, and Daily living/independence skills    Summary of Group / Topics Discussed:    Recovery Principles, Co-occurring Illness Education and Symptom management, Emotions/expression, Relapse Prevention.     Patients completed daily check ins and the question of the day  Patients discussed life lessons, phrases, quotes that have been helpful in making changes or progress.   Patients engaged in reading and processing daily meditations.    Facilitator: CHRIS Pineda provided/reviewed handouts on  What is Trauma\",\"Little  t  trauma and Big  T  trauma\"\"Common Reactions to Trauma\",and \"Growing stronger from Trauma\"    Lilly and patients discussed \"Suviving and Growing From Trauma.\"   Patients were able to discuss and ask mental health related questions and get feedback.            Group Attendance:  Attended group session    Patient's response to the group topic/interactions:  cooperative with task    Patient appeared to be Engaged.        Client specific details:  Patient shared feeling loved and peaceful; grateful for life. Patient shared and processed the reflection reading; shared appropriate feedbacks during discussion.    "

## 2024-05-22 NOTE — GROUP NOTE
"Group Therapy Documentation    PATIENT'S NAME: Alejandrina Clement  MRN:   [id number]  :   1965  ACCT. NUMBER: [id number]  DATE OF SERVICE: 24  START TIME: 12:30 PM  END TIME:  2:30 PM  FACILITATOR(S): Fanny Sanford LADC  TOPIC: BEH Group Therapy  Number of patients attending the group:  6  Group Length:  2 Hours    Group Therapy Type: Recovery strategies    Summary of Group / Topics Discussed:    Recovery Principles        Patients participated in daily meditation readings and check in question.   Patients engaged in a discussion of the importance of having a sober network made up of different people and backgrounds.  Patients shared about the supportive people in their life and how they have reached out for help to those people, and what tools are helpful in asking for help  Patients disuccsed the advantages in not relying xander on one person or partner for all supportive needs.     Patients engaged in a group art activity of making a \" Support Ring\"   The support rings purpose is to have names/numbers/reminders/skills that directly link to sponsors, probation, crisis help, counselor, national suicide hotline, local crisis services, grounding techniques, and local intergroup contacts. AA members, sober connections, and help with recovery. Its created with different art items per the creator, and information is added and then laminated and put onto a versatile ring that can be latched to a purse, coat, key chain, glove compartment, book bag, laptop or necklace. For 24/7-365 support.     During this activity- conversations often alert peers of local resources and events, and builds awareness of what to add to their support rings.        Group Attendance:  Attended group session    Patient's response to the group topic/interactions:  cooperative with task    Patient appeared to be Actively participating.        Client specific details:  Alejandrina Attended small group therapy. Patient engaged in " discussion and participated in sharing feedback to their peers.   .

## 2024-05-22 NOTE — GROUP NOTE
Psychoeducation Group Documentation    PATIENT'S NAME: Alejandrina Clement  MRN:   [id number]  :   1965  ACCT. NUMBER: [id number]  DATE OF SERVICE: 24  START TIME:  8:30 AM  END TIME:  9:30 AM  FACILITATOR(S): Thomas Ryder LADC; Jhon Natarajan LADC; Opal Becerril LADC  TOPIC: BEH Pyschoeducation  Number of patients attending the group:  24  Group Length:  1 Hours    Skills Group Therapy Type: Recovery skills    Summary of Group / Topics Discussed:    Balanced lifestyle skills        Group Attendance:  Attended group session    Patient's response to the group topic/interactions:  cooperative with task    Patient appeared to be Attentive.         Client specific details:  Alejandrina gave appropriate feedback..

## 2024-05-23 ENCOUNTER — TELEPHONE (OUTPATIENT)
Dept: BEHAVIORAL HEALTH | Facility: CLINIC | Age: 59
End: 2024-05-23
Payer: COMMERCIAL

## 2024-05-23 ENCOUNTER — HOSPITAL ENCOUNTER (OUTPATIENT)
Dept: BEHAVIORAL HEALTH | Facility: CLINIC | Age: 59
Discharge: HOME | End: 2024-05-23
Attending: FAMILY MEDICINE
Payer: COMMERCIAL

## 2024-05-23 DIAGNOSIS — F51.01 PRIMARY INSOMNIA: Primary | ICD-10-CM

## 2024-05-23 PROCEDURE — H2035 A/D TX PROGRAM, PER HOUR: HCPCS | Mod: HQ

## 2024-05-23 PROCEDURE — 1002N00001 HC LODGING PLUS FACILITY CHARGE ADULT

## 2024-05-23 RX ORDER — QUETIAPINE FUMARATE 50 MG/1
50-75 TABLET, FILM COATED ORAL AT BEDTIME
Qty: 21 TABLET | Refills: 0 | Status: SHIPPED | OUTPATIENT
Start: 2024-05-23 | End: 2024-06-18

## 2024-05-23 NOTE — GROUP NOTE
"Group Therapy Documentation    PATIENT'S NAME: Alejandrina Clement  MRN:   [id number]  :   1965  ACCT. NUMBER: [id number]  DATE OF SERVICE: 24  START TIME:  9:00 AM  END TIME: 11:00 AM  FACILITATOR(S): Anthony Wooten LADC  TOPIC: BEH Group Therapy  Number of patients attending the group:  5  Group Length:  2 Hours    Group Therapy Type: Recovery strategies    Summary of Group / Topics Discussed:    Recovery Principles, Mindfulness/Relaxation, Coping/DBT informed care, Trauma informed care, Disease of addiction, and Emotions/expression      Group Attendance:  Attended group session    Patient's response to the group topic/interactions:  cooperative with task    Patient appeared to be Attentive and Engaged.        Client specific details:  Alejandrina participated in morning group. She checked in and took part in the reading and discussion. For the second part of group, she listened to and gave positive feedback on her peer's assignment. She presented her \"Grief and Loss\" assignment to the group.    "

## 2024-05-23 NOTE — TELEPHONE ENCOUNTER
Briefly reviewed chart, will send 2-week prescription for Seroquel 75mg at bedtime, can discuss further fills at upcoming visit 6/5. Discontinue doxepin.    Jona Odom MD

## 2024-05-23 NOTE — PROGRESS NOTES
CALVINN met with pt to discuss medications. Pt informed of of her new increased Seroquel order and that Doxepin was discontinued and sent to storage. Pt educated that she should not take these two medications together.

## 2024-05-23 NOTE — PROGRESS NOTES
Name: Alejandrina Clement  Date: 5/23/2024  Medical Record: [id number]    Envelope Number: [id number]  List of Contents (List each item separately in new row):   Doxepin HCL 10 mg caps.   Admission:  I am responsible for any personal items that are not sent to the safe or pharmacy.  [place] is not responsible for loss, theft or damage of any property in my possession.    Patient Signature:  ___________________________________________       Date/Time:__________________________    Staff Signature: __________________________________       Date/Time:__________________________    [facility] Staff person, if patient is unable/unwilling to sign:      __________________________________________________________       Date/Time: __________________________    **All medications are packaged by LP staff and securely stored on [facility]. Medications left by patients at discharge will be packaged by LP staff and transported by LP staff to inpatient pharmacy for storage.**    Discharge:  [place] has returned all of my personal belongings:    Patient Signature: ________________________________________     Date/Time: ____________________________________    Staff Signature: ______________________________________     Date/Time:_____________________________________

## 2024-05-23 NOTE — GROUP NOTE
Group Therapy Documentation    PATIENT'S NAME: Alejandrina Clement  MRN:   [id number]  :   1965  ACCT. NUMBER: [id number]  DATE OF SERVICE: 24  START TIME:  3:00 PM  END TIME:  4:00 PM  FACILITATOR(S): Fanny Sanford LADC; Opal Becerril LADC; Elizabeth Wall LADC  TOPIC: BEH Group Therapy  Number of patients attending the group:  24  Group Length:  2 Hours    Group Therapy Type: Recovery strategies    Summary of Group / Topics Discussed:    Mindfulness/Relaxation and Medication management    Presenter shared video addressing Mindfulness and included guided meditation. Discussion included the importance of mindfulness and gratitude in recovery.       Group Attendance:  Attended group session    Patient's response to the group topic/interactions:  cooperative with task    Patient appeared to be Actively participating.        Client specific details:  Alejandrina attended afternoon skills group and participated in processing discussion. Patient remained engaged and participated throughout group session.       CANDIE Flores

## 2024-05-23 NOTE — TELEPHONE ENCOUNTER
Pt came to [facility] office asking to have a message sent to Dr. Odom about her medication. Pt would like to be put back on Seroquel and would like to have the dose increased to 75 mg nightly as needed.

## 2024-05-23 NOTE — TELEPHONE ENCOUNTER
LPRN went to update pt's chart with new Quetiapine(Seroquel) order and to discontinue Doxepin. Writer discovered that on 5/15/24 when pt was started on Doxepin the Quetiapine order was never discontinued. Pt has mistakenly been taking both medications for the past week. MAR now updated to reflect current order.

## 2024-05-23 NOTE — GROUP NOTE
Group Therapy Documentation    PATIENT'S NAME: Alejandrina Clement  MRN:   [id number]  :   1965  ACCT. NUMBER: [id number]  DATE OF SERVICE: 24  START TIME: 12:30 AM  END TIME:  2:30 PM  FACILITATOR(S): Fanny Sanford LADC  TOPIC: BEH Group Therapy  Number of patients attending the group:  6  Group Length:  2 Hours    Group Therapy Type: Recovery strategies    Summary of Group / Topics Discussed:    Recovery Principles    Patients completed daily check ins and the question of the day.    Patients presented assignments and shared feedback.    Patients engaged in reading and processing daily meditations.    Patients engaged in completion ceremony for a peer.       Group Attendance:  Attended group session    Patient's response to the group topic/interactions:  cooperative with task    Patient appeared to be Actively participating.        Client specific details:  Alejandrina Attended small group therapy. Patient engaged in discussion and participated in sharing feedback to their peers.

## 2024-05-23 NOTE — PROGRESS NOTES
"INDIVIDUAL SESSION SUMMARY    D) Met with client on 24 from 9:00-9:15am. Client is in the [facility] program. Client shared feeling \"sad\" as she gets closer to completing this program. Client shared that some of her motivation to be sober comes from a promise she made to her father before he . Client shared that she's feeling \"lonely\" at night and is having trouble sleeping which she intends to report to LP nurse this morning. Client shared that she did her exercises this morning and is staying on top of her daily routine. Client shared that she's looking forward to sober living.     I)  Provided client with verbal interventions including validation, compassion, and support, Provided positive reinforcement for progress towards goals, gains in insight, and application of skills, Discussed the importance of recovery behaviors such as forming/utilizing a sober support network, daily rituals, goal setting, and identifying relapse warning signs, and Discussed healthy boundaries, positive self-talk, self-compassion, and self-trust.    A)   Client appears to be gaining insights into their emotions and using healthy coping skills for managing stress, Client appears to be forming relationships with their peers, and Client appears to understand the importance of a sober support network.    P) Next session is scheduled for 24.    Lilly Clarke, CHRIS  2024    "

## 2024-05-24 ENCOUNTER — HOSPITAL ENCOUNTER (OUTPATIENT)
Dept: BEHAVIORAL HEALTH | Facility: CLINIC | Age: 59
Discharge: HOME | End: 2024-05-24
Attending: FAMILY MEDICINE
Payer: COMMERCIAL

## 2024-05-24 DIAGNOSIS — F10.20 ALCOHOL USE DISORDER, SEVERE, DEPENDENCE (H): ICD-10-CM

## 2024-05-24 DIAGNOSIS — F41.9 ANXIETY: ICD-10-CM

## 2024-05-24 DIAGNOSIS — F32.9 MAJOR DEPRESSIVE DISORDER WITH CURRENT ACTIVE EPISODE, UNSPECIFIED DEPRESSION EPISODE SEVERITY, UNSPECIFIED WHETHER RECURRENT: ICD-10-CM

## 2024-05-24 PROCEDURE — 1002N00001 HC LODGING PLUS FACILITY CHARGE ADULT

## 2024-05-24 PROCEDURE — H2035 A/D TX PROGRAM, PER HOUR: HCPCS | Mod: HQ

## 2024-05-24 NOTE — TELEPHONE ENCOUNTER
Last seen: 5/15/24  RTC: not yet documented  Cancel: none  No-show: none  Next appt: 6/5/24     Incoming refill from [facility] via electronic request through pharmacy    Medication requested:   Pending Prescriptions:                       Disp   Refills    escitalopram (LEXAPRO) 10 MG tablet       30 tab*0            Sig: Take 1 tablet (10 mg) by mouth daily    gabapentin (NEURONTIN) 300 MG capsule     90 cap*0            Sig: Take 1 capsule (300 mg) by mouth 3 times daily    naltrexone (DEPADE/REVIA) 50 MG tablet    30 tab*0            Sig: Take 1/2 tablet for 1 week, then increase to 1           tablet daily    Last refill per   Gabapentin last filled on 5/17/24 for 30 d/s per [place] PDMP    From chart note:   5/15/24 office visit note still in progress    Medication unable to be refilled by RN due to criteria not met as indicated.                 []Eligibility - not seen in the last year              []Supervision - no future appointment              []Compliance - no shows, cancellations or lapse in therapy              []Verification - order discrepancy              []Controlled medication              []Medication not included in policy              []90-day supply request              [x]Other:  Office visit unsigned. Appears patient should have enough medication (besides Lexapro) to last to the 6/5 appt with Dr. Odom. Will still forward request to provider for guidance, as last office visit note is unsigned.    Emperatriz Montejo RN on 5/24/2024 at 11:13 AM

## 2024-05-24 NOTE — GROUP NOTE
"Group Therapy Documentation    PATIENT'S NAME: Alejandrina Clement  MRN:   [id number]  :   1965  ACCT. NUMBER: [id number]  DATE OF SERVICE: 24  START TIME:  9:00 AM  END TIME: 11:00 AM  FACILITATOR(S): Fanny Sanford LADC  TOPIC: BEH Group Therapy  Number of patients attending the group:  5  Group Length:  2 Hours    Group Therapy Type: Recovery strategies    Summary of Group / Topics Discussed:    Recovery Principles    Patients completed daily check ins and the question of the day, \" What is one thing you plan to do for self care this weekend?\"        Patients engaged in reading and processing daily meditations.     Patients engaged in playing recovery bingo.      Group Attendance:  Attended group session    Patient's response to the group topic/interactions:  cooperative with task    Patient appeared to be Actively participating.        Client specific details:  Alejandrina Attended small group therapy. Patient engaged in discussion and participated in sharing feedback to their peers.       "

## 2024-05-24 NOTE — GROUP NOTE
Group Therapy Documentation    PATIENT'S NAME: Alejnadrina Clement  MRN:   [id number]  :   1965  ACCT. NUMBER: [id number]  DATE OF SERVICE: 24  START TIME: 12:30 PM  END TIME:  2:30 PM  FACILITATOR(S): Fanny Sanford LADC  TOPIC: BEH Group Therapy  Number of patients attending the group:  5  Group Length:  2 Hours    Group Therapy Type: Recovery strategies    Summary of Group / Topics Discussed:    Recovery Principles    Patients viewed an addiction/mental health based film. This film addressed: addiction as a disease, relationships and addiction, engagement in AA, and evaluating priorities when getting sober.   Patients engaged in a thorough discussion about the film, and shared personal experiences, and how the film helped them process their own life events.       Group Attendance:  Attended group session    Patient's response to the group topic/interactions:  cooperative with task    Patient appeared to be Actively participating.        Client specific details:  Alejandrina Attended small group therapy. Patient engaged in discussion and participated in sharing feedback to their peers.   .

## 2024-05-24 NOTE — PROGRESS NOTES
Name: Alejandrina Clement  Date: 5/24/2024  Medical Record: [id number]    Envelope Number: [id number]    List of Contents (List each item separately in new row):   Quetiapine Fumarate 25MG tabs    Admission:  I am responsible for any personal items that are not sent to the safe or pharmacy.  [place] is not responsible for loss, theft or damage of any property in my possession.  Patient Signature:  ___________________________________________       Date/Time:__________________________    Staff Signature: __________________________________       Date/Time:__________________________    [facility] Staff person, if patient is unable/unwilling to sign:      __________________________________________________________       Date/Time: __________________________      **All medications are packaged by LP staff and securely stored on [facility]. Medications left by patients at discharge will be packaged by LP staff and transported by LP staff to inpatient pharmacy for storage.**  Discharge:  [facility] has returned all of my personal belongings:    Patient Signature: ________________________________________     Date/Time: ____________________________________    Staff Signature: ______________________________________     Date/Time:_____________________________________

## 2024-05-25 ENCOUNTER — HOSPITAL ENCOUNTER (OUTPATIENT)
Dept: BEHAVIORAL HEALTH | Facility: CLINIC | Age: 59
Discharge: HOME | End: 2024-05-25
Attending: FAMILY MEDICINE
Payer: COMMERCIAL

## 2024-05-25 PROCEDURE — 1002N00001 HC LODGING PLUS FACILITY CHARGE ADULT

## 2024-05-25 PROCEDURE — H2035 A/D TX PROGRAM, PER HOUR: HCPCS | Mod: HQ

## 2024-05-25 NOTE — GROUP NOTE
Group Therapy Documentation    PATIENT'S NAME: Alejandrina Clement  MRN:   [id number]  :   1965  ACCT. NUMBER: [id number]  DATE OF SERVICE: 24  START TIME:  9:00 AM  END TIME: 11:00 AM  FACILITATOR(S): Jerry Shah LADC  TOPIC: BEH Group Therapy  Number of patients attending the group:  26  Group Length:  2 Hours    Group Therapy Type: Recovery strategies, Emotion processing, and Daily living/independence skills    Summary of Group / Topics Discussed:    Recovery Principles, Sober coping skills, Disease of addiction, and Relapse prevention    Group began with an outside speaker sharing his story of addiction and recovery. Group then viewed a lecture regarding gratitude, followed by a related discussion and activity.       Group Attendance:  Attended group session    Patient's response to the group topic/interactions:  cooperative with task    Patient appeared to be Actively participating.        Client specific details:  Patient actively engaged in group discussion.

## 2024-05-25 NOTE — GROUP NOTE
Psychoeducation Group Documentation    PATIENT'S NAME: Alejandrina Clement  MRN:   [id number]  :   1965  ACCT. NUMBER: [id number]  DATE OF SERVICE: 24  START TIME: 12:30 PM  END TIME:  2:30 PM  FACILITATOR(S): Carolina Lee LADC; Kyle Mcrae RN; Janie Gruber LADC  TOPIC: BEH Pyschoeducation  Number of patients attending the group: 25  Group Length:  2 Hours    Skills Group Therapy Type: Recovery skills    Summary of Group / Topics Discussed:    Relapse prevention skills        Group Attendance:  Attended group session    Patient's response to the group topic/interactions:  cooperative with task    Patient appeared to be Actively participating.         Client specific details: Patient attended, participated, and appropriate in weekend Relapse Prevention workshop.

## 2024-05-26 ENCOUNTER — HOSPITAL ENCOUNTER (OUTPATIENT)
Dept: BEHAVIORAL HEALTH | Facility: CLINIC | Age: 59
Discharge: HOME | End: 2024-05-26
Attending: FAMILY MEDICINE
Payer: COMMERCIAL

## 2024-05-26 PROCEDURE — H2035 A/D TX PROGRAM, PER HOUR: HCPCS | Mod: HQ

## 2024-05-26 PROCEDURE — 1002N00001 HC LODGING PLUS FACILITY CHARGE ADULT

## 2024-05-26 NOTE — GROUP NOTE
Group Therapy Documentation    PATIENT'S NAME: Alejandrina Clement  MRN:   [id number]  :   1965  ACCT. NUMBER: [id number]  DATE OF SERVICE: 24  START TIME: 12:30 PM  END TIME:  1:30 PM  FACILITATOR(S): Carolina Lee LADC  TOPIC: BEH Group Therapy  Number of patients attending the group:  25    Group Length:  1 Hour    Group Therapy Type: Emotion processing    Summary of Group / Topics Discussed:    Sober coping skills, Mindfulness/Relaxation, and Relapse prevention      Group Attendance:  Attended group session    Patient's response to the group topic/interactions:  cooperative with task    Patient appeared to be Actively participating, Attentive, and Engaged.        Client specific details:  Patient attended group lecture and was attentive and participative.

## 2024-05-26 NOTE — GROUP NOTE
Group Therapy Documentation    PATIENT'S NAME: Alejandrina Clement  MRN:   [id number]  :   1965  ACCT. NUMBER: [id number]  DATE OF SERVICE: 24  START TIME:  8:45 AM  END TIME: 10:30 AM  FACILITATOR(S): Nisreen Manzo RN; Carolina Lee LADC; Jerry Shah [facility]  TOPIC: BEH Group Therapy  Number of patients attending the group:  25  Group Length:  2 Hours    Group Therapy Type: Daily living/independence skills and Health and wellbeing     Summary of Group / Topics Discussed:    Balanced lifestyle    Group lecture was presented by nursing staff regarding diet and nutrition. Feedback was provided by participants throughout group session.      Group Attendance:  Attended group session    Patient's response to the group topic/interactions:  cooperative with task    Patient appeared to be Actively participating.        Client specific details:  Patient actively engaged in group discussion.

## 2024-05-27 ENCOUNTER — HOSPITAL ENCOUNTER (OUTPATIENT)
Dept: BEHAVIORAL HEALTH | Facility: CLINIC | Age: 59
Discharge: HOME | End: 2024-05-27
Attending: FAMILY MEDICINE
Payer: COMMERCIAL

## 2024-05-27 PROCEDURE — H2035 A/D TX PROGRAM, PER HOUR: HCPCS | Mod: HQ

## 2024-05-27 PROCEDURE — 1002N00001 HC LODGING PLUS FACILITY CHARGE ADULT

## 2024-05-27 PROCEDURE — H2035 A/D TX PROGRAM, PER HOUR: HCPCS | Mod: HQ | Performed by: COUNSELOR

## 2024-05-27 NOTE — GROUP NOTE
"Group Therapy Documentation    PATIENT'S NAME: Alejandrina Clement  MRN:   [id number]  :   1965  ACCT. NUMBER: [id number]  DATE OF SERVICE: 24  START TIME:  9:00 AM  END TIME: 11:00 AM  FACILITATOR(S): Opal Becerril LADC  TOPIC: BEH Group Therapy  Number of patients attending the group:  6  Group Length:  2 Hours    Group Therapy Type: Recovery strategies and Emotion processing    Summary of Group / Topics Discussed:    Recovery Principles, Sober coping skills, Mindfulness/Relaxation, Disease of addiction, and Emotions/expression  Started the session with daily check-in and answered the question(s): \"Who is your hero and what are their 3 hero-like traits?\". Discussion with the diaiy reflection reading. Assignment processing and feedbacks. Ended the session with the serenity prayer.      Group Attendance:  Attended group session    Patient's response to the group topic/interactions:  cooperative with task    Patient appeared to be Engaged.        Client specific details:  Patient checked-in feeling excited. Patient shared appropriate feedbacks.    "

## 2024-05-27 NOTE — GROUP NOTE
"Group Therapy Documentation    PATIENT'S NAME: Alejandrina Clement  MRN:   [id number]  :   1965  ACCT. NUMBER: [id number]  DATE OF SERVICE: 24  START TIME: 12:30 PM  END TIME:  2:30 PM  FACILITATOR(S): Celia Loja LADC  TOPIC: BEH Group Therapy  Number of patients attending the group:  6  Group Length:  2 Hours    Group Therapy Type: Recovery strategies and Emotion processing    Summary of Group / Topics Discussed:    Recovery Principles, Balanced lifestyle, Cognitive behavioral therapy skills, Trauma informed care, and Emotions/expression    Writer facilitated group therapy related to relapse prevention, developing appropriate supports for recovery, and Psychoeducation related to codependency.  Patients presented assignments and gave and received feedback      Group Attendance:  Attended group session    Patient's response to the group topic/interactions:  cooperative with task, discussed personal experience with topic, expressed readiness to alter behaviors, listened actively, and offered helpful suggestions to peers    Patient appeared to be Actively participating, Attentive, and Engaged.        Client specific details:  Patient discussed assignments including, \"creating new beliefs\" and \"identifying relapse triggers\".  Client also listened attentively to peer assignment and received appropriate feedback from peers related to her assignments.      "

## 2024-05-28 ENCOUNTER — HOSPITAL ENCOUNTER (OUTPATIENT)
Dept: BEHAVIORAL HEALTH | Facility: CLINIC | Age: 59
Discharge: HOME | End: 2024-05-28
Attending: FAMILY MEDICINE
Payer: COMMERCIAL

## 2024-05-28 PROCEDURE — 1002N00001 HC LODGING PLUS FACILITY CHARGE ADULT

## 2024-05-28 PROCEDURE — H2035 A/D TX PROGRAM, PER HOUR: HCPCS | Mod: HQ

## 2024-05-28 NOTE — GROUP NOTE
"Group Therapy Documentation    PATIENT'S NAME: Alejandrina Clement  MRN:   [id number]  :   1965  ACCT. NUMBER: [id number]  DATE OF SERVICE: 24  START TIME:  9:00 AM  END TIME: 11:00 AM  FACILITATOR(S): Opal Becerril LADC  TOPIC: BEH Group Therapy  Number of patients attending the group:  5  Group Length:  2 Hours    Group Therapy Type: Recovery strategies and Emotion processing    Summary of Group / Topics Discussed:    Recovery Principles, Relationship/socialization, Balanced lifestyle, Mindfulness/Relaxation, and Emotions/expression  Daily check-ins, reflection reading and discussion, assignment presentation and processing with peer feedbacks.      Group Attendance:  Attended group session    Patient's response to the group topic/interactions:  cooperative with task    Patient appeared to be Engaged.        Client specific details:  Patient shared feeling excited and content for she will be completing on Friday. Patient shared and processed an assignment on \"Balancing Recovery, Work and Family\". Patient received appropriate feedbacks.  "

## 2024-05-28 NOTE — GROUP NOTE
Group Therapy Documentation    PATIENT'S NAME: Alejandrina Clement  MRN:   [id number]  :   1965  ACCT. NUMBER: [id number]  DATE OF SERVICE: 24  START TIME: 12:30 PM  END TIME:  2:30 PM  FACILITATOR(S): Fanny Sanford LADC; Carmen Pa  TOPIC: BEH Group Therapy  Number of patients attending the group:  6  Group Length:  2 Hours    Group Therapy Type: Recovery strategies    Summary of Group / Topics Discussed:    Recovery Principles    Spiritual Group Therapy consisted of Spiritual Care and addressing Principles that are important in recovery, and wellness of self. Patients and facilitators ([place] & LADC)  reviewed topics related to sense of self, identity, and relations to others within spirituality/relision/nonspiritual concepts.       Group Attendance:  Attended group session    Patient's response to the group topic/interactions:  cooperative with task    Patient appeared to be Actively participating.        Client specific details:  Alejandrina Attended small group therapy. Patient engaged in discussion and participated in sharing feedback to their peers.   .

## 2024-05-28 NOTE — GROUP NOTE
Group Therapy Documentation    PATIENT'S NAME: Alejandrina Clement  MRN:   [id number]  :   1965  ACCT. NUMBER: [id number]  DATE OF SERVICE: 24  START TIME:  3:00 PM  END TIME:  4:00 PM  FACILITATOR(S): Fanny Sanford LADC; Anthony Wooten LADC  TOPIC: BEH Group Therapy  Number of patients attending the group:  23  Group Length:  1 Hours    Group Therapy Type: Recovery strategies    Summary of Group / Topics Discussed:    Coping/DBT informed care    Facilitator CANDIE Mojica gave a 55 min presentation on DBT. The lecture consisted of DBT: Overview, Distress Tolerance Skill Building, and Interpersonal Effectiveness Skills.       Group Attendance:  Attended group session    Patient's response to the group topic/interactions:  cooperative with task    Patient appeared to be Actively participating.        Client specific details:  Alejandrina attended afternoon skills group and participated in processing discussion. Patient remained engaged and participated throughout group session.       CANDIE Flores

## 2024-05-28 NOTE — TELEPHONE ENCOUNTER
Please advise if pt needs any additional follow up regarding her use of both doxepine and seroquel for 8 days. Pt has reported no adverse symptoms to RN staff on unit. Please also see separate  refill request for all of pt's meds.

## 2024-05-29 ENCOUNTER — HOSPITAL ENCOUNTER (OUTPATIENT)
Dept: BEHAVIORAL HEALTH | Facility: CLINIC | Age: 59
Discharge: HOME | End: 2024-05-29
Attending: FAMILY MEDICINE
Payer: COMMERCIAL

## 2024-05-29 PROCEDURE — 1002N00001 HC LODGING PLUS FACILITY CHARGE ADULT

## 2024-05-29 PROCEDURE — H2035 A/D TX PROGRAM, PER HOUR: HCPCS | Mod: HQ

## 2024-05-29 RX ORDER — ESCITALOPRAM OXALATE 10 MG/1
10 TABLET ORAL DAILY
Qty: 30 TABLET | Refills: 0 | Status: SHIPPED | OUTPATIENT
Start: 2024-05-29 | End: 2024-06-17

## 2024-05-29 RX ORDER — NALTREXONE HYDROCHLORIDE 50 MG/1
50 TABLET, FILM COATED ORAL DAILY
Qty: 30 TABLET | Refills: 0 | Status: SHIPPED | OUTPATIENT
Start: 2024-05-29

## 2024-05-29 RX ORDER — GABAPENTIN 300 MG/1
300 CAPSULE ORAL 3 TIMES DAILY
Qty: 90 CAPSULE | Refills: 0 | Status: SHIPPED | OUTPATIENT
Start: 2024-05-29 | End: 2024-06-17

## 2024-05-29 NOTE — TELEPHONE ENCOUNTER
Reviewed chart, no further follow-up required. Doxepin has been removed from medication list and updated prescription for quetiapine is correct in chart. Separate refill request has been approved.    Jona Odom MD

## 2024-05-29 NOTE — PROGRESS NOTES
"Writer contacted [facility] regarding patient's ongoing liver concerns.  Clinic stated that they had received most recent ultrasound results.  They are awaiting results of the 6/6/24 \"Fibroscan\" before considering any new medications related to above issue.  Patient and treatment team updated accordingly as to above, with patient strongly encouraged not to miss said 6/6 Fibroscan, and to then subsequently contact [facility] at her earliest convenience for further care options.    "

## 2024-05-29 NOTE — GROUP NOTE
Group Therapy Documentation    PATIENT'S NAME: Alejandrina Clement  MRN:   [id number]  :   1965  ACCT. NUMBER: [id number]  DATE OF SERVICE: 24  START TIME: 12:30 PM  END TIME:  2:30 PM  FACILITATOR(S): Anthony Wooten LADC  TOPIC: BEH Group Therapy  Number of patients attending the group:  5  Group Length:  2 Hours    Group Therapy Type: Recovery strategies    Summary of Group / Topics Discussed:    Recovery Principles, Mindfulness/Relaxation, Coping/DBT informed care, Trauma informed care, Disease of addiction, and Emotions/expression      Group Attendance:  Attended group session    Patient's response to the group topic/interactions:  cooperative with task    Patient appeared to be Attentive and Engaged.        Client specific details:  Alejandrina participated in afternoon group. She took part in a mindfulness activity followed by a discussion. For the second part of group, she took part in an art activity where she picked out a song and josi how the song made her feel.

## 2024-05-29 NOTE — GROUP NOTE
Psychoeducation Group Documentation    PATIENT'S NAME: Alejandrina Clement  MRN:   [id number]  :   1965  ACCT. NUMBER: [id number]  DATE OF SERVICE: 24  START TIME:  8:30 AM  END TIME:  9:30 AM  FACILITATOR(S): Thomas Ryder LADC; Opal Becerril LADC; Jhon Natarajan LADC  TOPIC: BEH Pyschoeducation  Number of patients attending the group:  23  Group Length:  1 Hours    Skills Group Therapy Type: Healthy behaviors development    Summary of Group / Topics Discussed:    Balanced lifestyle skills        Group Attendance:  Attended group session    Patient's response to the group topic/interactions:  cooperative with task and listened actively    Patient appeared to be Attentive.         Client specific details:  Alejandrina gave appropriate feedback. .

## 2024-05-29 NOTE — PROGRESS NOTES
[facility] Weekly Treatment Plan Review      Date span:  24-24    Patient did not have absences from programming during this time period    Treatment Plan initiated on: 24.    Dimension1: Acute Intoxication/Withdrawal Potential -   Previous Dimension Ratin  Current Dimension Ratin  Date of Last Use: 24  Any reports of withdrawal symptoms - No    Dimension 2: Biomedical Conditions & Complications -   Previous Dimension Ratin  Current Dimension Ratin  Medical Concerns:  Patient had ultrasound appt this week for follow up on lab results. Patient reported an upcoming scan on 24.  Current Medications & Medication Changes:  Current Outpatient Medications   Medication Sig Dispense Refill    acetaminophen (TYLENOL) 500 MG tablet Take 500-1,000 mg by mouth every 8 hours as needed for mild pain      alum & mag hydroxide-simethicone (MAALOX) 200-200-20 MG/5ML SUSP suspension Take 30 mLs by mouth every 6 hours as needed for indigestion      benzocaine-menthol (CEPACOL) 15-3.6 MG lozenge Place 1 lozenge inside cheek every 2 hours as needed for sore throat      escitalopram (LEXAPRO) 10 MG tablet Take 1 tablet (10 mg) by mouth daily 30 tablet 0    gabapentin (NEURONTIN) 300 MG capsule Take 1 capsule (300 mg) by mouth 3 times daily 90 capsule 0    guaiFENesin-dextromethorphan (ROBITUSSIN DM) 100-10 MG/5ML syrup Take 10 mLs by mouth 4 times daily as needed for cough      ibuprofen (ADVIL/MOTRIN) 200 MG tablet Take 600 mg by mouth every 6 hours as needed for pain      loratadine (CLARITIN) 10 MG tablet Take 10 mg by mouth daily      melatonin 5 MG tablet Take 5 mg by mouth nightly as needed for sleep      naltrexone (DEPADE/REVIA) 50 MG tablet Take 1 tablet (50 mg) by mouth daily 30 tablet 0    nicotine (NICODERM CQ) 21 MG/24HR 24 hr patch Place 1 patch onto the skin every 24 hours 28 patch 1    QUEtiapine (SEROQUEL) 50 MG tablet Take 1-1.5 tablets (50-75 mg) by mouth at bedtime 21  tablet 0    senna-docusate (SENOKOT-S/PERICOLACE) 8.6-50 MG tablet Take 2 tablets by mouth daily as needed for constipation       No current facility-administered medications for this encounter.     Facility-Administered Medications Ordered in Other Encounters   Medication Dose Route Frequency Provider Last Rate Last Admin    Self Administer Medications: Behavioral Services   Does not apply See Admin Instructions Brenda Negrete MD         Medication Prescriber: Jona Odom MD- [facility]  Taking meds as prescribed? Yes  Medication side effects or concerns:  None reported.  Outside medical appointments this week (list provider and reason for visit):  Pt was seen in psychiatry on 5-15-24 and for an ultrasound on 5-15*24.    Narrative: Pt seems fully functioning and seeks medical attention as needed. She attended lecture given by  nurse on Self Care on 24.     Dimension 3: Emotional/Behavioral Conditions & Complications -   Previous Dimension Ratin  Current Dimension Ratin  PHQ2:       2024    11:00 AM 2024     3:00 PM 2024    11:00 AM   PHQ-2 (  [employer])   Q1: Little interest or pleasure in doing things 2 2 0   Q2: Feeling down, depressed or hopeless 0 2 0   PHQ-2 Score 2 4 0      GAD2:       2024    11:00 AM 5/15/2024     8:32 AM 2024     3:00 PM 2024    11:00 AM   ESTEPHANIE-2   Feeling nervous, anxious, or on edge 2 1 2 1   Not being able to stop or control worrying 0 1 2 1   ESTEPHANIE-2 Total Score 2 2 4 2     Mental health diagnosis: Depression and Anxiety  Date of last SIB:  Patient denies.  Date of  last SI:  Patient denies.  Date of last HI: Patient denies.  Behavioral Targets: Follow recommendations of medical provider. Report any alcohol or drug use to counselor and any increase in withdrawal symptoms to nurse. Stabilize and maintain mental health.   Risk factors: The patient lacks relapse prevention, coping, and refusal skills, as well as a sober peer  "support network. She has dual issues of MI and CD, a tendency to isolate, and a significant history of trauma, abuse, shame, grief, and loss issues.  Protective factors:  spirituality, abstinence from substances, adherence with prescribed medication, agreement to use safety plan, living with other people, structured day, positive social skills, and access to a variety of clinical interventions  Current MH Assignments:  \"Grief and Loss,\"  Correcting Distorted Thinking     Narrative: Current Mental Health symptoms include: \"depressive days and feeling anxious meeting the new peers\" She reports that her stress level has decreased due to \"engaging with peers, taking my medications, and asking for help when I need it.\"  Pt reported that her mood has not significantly changed this past week, and continues to be more stable. Patient met with psychotherapist this week.     [place] Suicide Severity Rating Scale (Short Version)      8/18/2020    10:46 AM 8/23/2020     8:13 PM 9/6/2020     7:22 PM 5/2/2024     9:46 AM 5/2/2024    11:55 AM 5/3/2024    10:52 AM 5/3/2024     4:00 PM   [place] Suicide Severity Rating (Short Version)   Over the past 2 weeks have you felt down, depressed, or hopeless? yes no no       Over the past 2 weeks have you had thoughts of killing yourself? no no no       Have you ever attempted to kill yourself? no no no       Q1 Wished to be Dead (Past Month)    1-->yes 1-->yes  1-->yes   Q2 Suicidal Thoughts (Past Month)    1-->yes 1-->yes  1-->yes   Q3 Suicidal Thought Method    0-->no 1-->yes  1-->yes   Q4 Suicidal Intent without Specific Plan    0-->no 1-->yes  0-->no   Q5 Suicide Intent with Specific Plan    0-->no 0-->no  0-->no   Q6 Suicide Behavior (Lifetime)    0-->no   1-->yes   Within the Past 3 Months?       0-->no   Level of Risk per Screen    low risk high risk  moderate risk   1. Wish to be Dead (Since Last Contact)      N    2. Non-Specific Active Suicidal Thoughts (Since Last Contact)     " " N    Actual Attempt (Since Last Contact)      N    Has subject engaged in non-suicidal self-injurious behavior? (Since Last Contact)      N    Interrupted Attempts (Since Last Contact)      N    Aborted or Self-Interrupted Attempt (Since Last Contact)      N    Preparatory Acts or Behavior (Since Last Contact)      N    Suicide (Since Last Contact)      N    Calculated C-SSRS Risk Score (Since Last Contact)      No Risk Indicated        Dimension 4: Treatment Acceptance / Resistance -   Previous Dimension Ratin  Current Dimension Ratin  KYARA Diagnosis: Alcohol Use Disorder Severe F10.20  Stimulant Use Disorder Severe F14.20  Cannabis Use Disorder Mild F12.20  Commitment to tx process/Stage of change- Pt consistently attends and participates in groups and lectures. She appears to be in the contemplative stage of change at this time.  KYARA assignments - \"Creating New Beliefs\"     Narrative - Pt reports her motivation this week is \"My family.\" Pt continues to attend assigned groups/lectures and contributing to discussion and feedback to peers. She reports that she has gotten along \"great\" with peers and staff this week.     Dimension 5: Relapse / Continued Problem Potential -   Previous Dimension Ratin  Current Dimension Ratin  Relapses this week - None  Urges to use - None  UA results - negative for all screened drugs    Narrative- Pt reports no cravings this past week. She reported not experiencing any triggers to use, but used the following coping skill(s): \"not glorifying past use, meditation, reading or watching TV, staying active.\" Pt participated in the spirituality group, facilitated by Carmen Lea and  counseling staff was present during group. Patient reports currently working on 2 assignments and plans to continue to present them in group.     Dimension 6: Recovery Environment -   Previous Dimension Ratin  Current Dimension Ratin  Family Involvement - n/a  Summarize " attendance at family groups and family sessions - n/a  Family supportive of treatment?  Yes  Recreational activities - TV, going to the park    Narrative - Pt is spending free time with same-gender peers and attending at least 3 virtual 12-step meetings weekly while in [place]. She reports having routine contact with her mother, sister, and nephews this week. Pt participated in weekend workshop on relationships and completed all activities.    Progress made on transition planning goals: Patient met with counselor and expressed plans for IOP./Sober living. Referral was sent to [facility], and patient plans to contact a sober house.     Justification for Continued Treatment at this Level of Care: Risk ratings indicate continued need for this level of care. Pt has been unable to maintain abstinence from alcohol while at the outpatient level of care, lacks long-term sober maintenance skills, lacks sober coping skills and has medical and mental health concerns which are exacerbated by substance use.     Treatment coordination activities this week:   none at this time  Need for peer recovery support referral? No    Discharge Planning:  Target Discharge Date/Timeframe:  5-31-24  Med Mgmt Provider/Appt:  JACIEL  Ind therapy Provider/Appt:  JACIEL  Family therapy Provider/Appt:  TBD  Other referrals:  none at this time.    Has vulnerable adult status changed? No    Interdisciplinary Clinical Supervision including: CANDIE and RN    Are Treatment Plan goals/objectives effective? Yes  *If no, list changes to treatment plan:    Are the current goals meeting client's needs? Yes  *If no, list the changes to treatment plan.    Patient Input / Response: Pt contributed to treatment plan review.    *Client agrees with any changes to the treatment plan: N/A  *Client received copy of changes: N/A  *Client is aware of right to access a treatment plan review: Yes    CANDIE Flores

## 2024-05-29 NOTE — GROUP NOTE
"Group Therapy Documentation    PATIENT'S NAME: Alejandrina Clement  MRN:   [id number]  :   1965  ACCT. NUMBER: [id number]  DATE OF SERVICE: 24  START TIME:  9:45 AM  END TIME: 11:30 AM  FACILITATOR(S): Opal Becerril LADC; Lilly Clarke  TOPIC: BEH Group Therapy  Number of patients attending the group:  4  Group Length:  2 Hours    Group Therapy Type: Recovery strategies, Emotion processing, and MICD    Summary of Group / Topics Discussed:    Recovery Principles, Co-occurring Illness Education and Symptom management, Emotions/expression, Relapse Prevention.     Patients completed daily check ins and the question of the day  Patients discussed life lessons, phrases, quotes that have been helpful in making changes or progress.   Patients engaged in reading and processing daily meditations.    Facilitator: CHRIS Pineda provided/reviewed handouts on  What is anxiety  and  Symptoms of Anxiety  and  How does Anxiety Grow? - The Cycle of Anxiety.\"     Lilly and patients discussed Treatment Goals and Treatment Options. Lilly led a guided imagery exercise called  The Container.\"          Group Attendance:  Attended group session    Patient's response to the group topic/interactions:  cooperative with task    Patient appeared to be Engaged.        Client specific details:  Patient was attentive and shared appropriate feedbacks. Patient checked-in feeling tired but contented.    " No

## 2024-05-30 ENCOUNTER — HOSPITAL ENCOUNTER (OUTPATIENT)
Dept: BEHAVIORAL HEALTH | Facility: CLINIC | Age: 59
Discharge: HOME | End: 2024-05-30
Attending: FAMILY MEDICINE
Payer: COMMERCIAL

## 2024-05-30 PROCEDURE — H2035 A/D TX PROGRAM, PER HOUR: HCPCS | Mod: HQ

## 2024-05-30 PROCEDURE — 1002N00001 HC LODGING PLUS FACILITY CHARGE ADULT

## 2024-05-30 NOTE — GROUP NOTE
Group Therapy Documentation    PATIENT'S NAME: Alejandrina Clement  MRN:   [id number]  :   1965  ACCT. NUMBER: [id number]  DATE OF SERVICE: 24  START TIME:  3:00 PM  END TIME:  4:00 PM  FACILITATOR(S): Carolina Lee LADC; Jerry Shah LADC  TOPIC: BEH Group Therapy  Number of patients attending the group:  20  Group Length:  2 Hours    Group Therapy Type: Recovery strategies    Summary of Group / Topics Discussed:    Sober coping skills and Relapse prevention    Group participants viewed a film regarding consequences to use, followed by a discussion led by counseling staff. Feedback was provided by participants.       Group Attendance:  Attended group session    Patient's response to the group topic/interactions:  cooperative with task    Patient appeared to be Actively participating.        Client specific details:  Patient actively engaged in group discussion.

## 2024-05-30 NOTE — GROUP NOTE
Group Therapy Documentation    PATIENT'S NAME: Alejandrina Clement  MRN:   [id number]  :   1965  ACCT. NUMBER: [id number]  DATE OF SERVICE: 24  START TIME:  9:00 AM  END TIME: 11:00 AM  FACILITATOR(S): Carolina Lee LADC  TOPIC: BEH Group Therapy  Number of patients attending the group:  5    Group Length:  2 Hours    Group Therapy Type: Recovery strategies, Emotion processing, and Daily living/independence skills    Summary of Group / Topics Discussed:    Recovery Principles, Sober coping skills, Relationship/socialization, and Relapse prevention      Group Attendance:  Attended group session    Patient's response to the group topic/interactions:  cooperative with task    Patient appeared to be Actively participating, Attentive, and Engaged.        Client specific details:  Patient attended group session and was attentive and participative.

## 2024-05-30 NOTE — PROGRESS NOTES
Met with client from 9:00-9:15am. Client shared that she's feeling confident and excited to start her IOP and move into her sober home tomorrow. Client shared that she will be focused on her recovery and making sober connections for the next several months. Client shared that she felt proud and her mom is also proud of her. Client shared that she feels more at peace regarding the death of her father.     No future sessions scheduled.     CHRIS West

## 2024-05-30 NOTE — PROGRESS NOTES
MICD Discharge/ Continuing Care Resources     Patient: Alejandrina Clement          MRN: [id number]                   : 1965    Personal Safety/Management of Symptoms  - Contact local ER if symptoms increase or SI/HI ideation.   - Follow safety plan.    Call Crisis Lines As Needed  [facility] [phone]  Suicide Prevention [phone]  Crisis Connection [phone]   [facility] [phone]        [facility] [phone]                           National Suicide Prevention [phone]           Abstinence/Relapse Prevention  * Take all medicines as prescribed, contact provider with concerns.   * Use coping skills from treatment: Call someone, attend meeting, sober event, sponsor,   safe place, read big book, exercise, read prevention plan. SEEK SUPPORT!    Community Resources/Supports   [facility] AA:  [phone] ( )  [place]  Alcoholics Anonymous : [phone]  Narcotics Anonymous : [address] ( [phone])  [place] Recovery Connection: Free recovery resources.  [phone]    Discharge Recommendations:  -Admit/Complete IOP: [facility] ( 5 Days Per Week- ASAM 2.1), Follow all recommendations.   -Schedule/Attend Psychiatrist/ Medical Provider appointments as needed.  -Schedule/Attend psychotherapy weekly with given referrals.   -Attend 3+weekly Support Meetings (AA, NA, MICD)   -Obtain/Maintain weekly contact with a Sponsor  -Continue to expand sober network, attend sober events/activities.  -Continue to monitor personal triggers, and implement prevention strategies.  -Remain in good standing with Probation and follow all recommendations (if any)  - Remain in contact with Commitment Worker, and follow all recommendations. (If any)      Patient Signature:     Date        Clinician Signature:      Date

## 2024-05-30 NOTE — PROGRESS NOTES
[address]  [place] [address]              Alejandrina Clement, 1965 : was admitted for evaluation/treatment of chemical dependency at [facility].  She took part in these program(s):     ______ The Inpatient Program   ______ The Outpatient Program   ___X___ The Lodging Plus Program   ______ Lodging Day Outpatient         Date admitted: 5/3/2024    Date discharged: 5/31/2024     Type of discharge:     ___X___ Satisfactory - completed evaluation / treatment   ______ Discharged without completing   ______ Behavioral discharge   ______ Transferred to another chemical dependency program   ______ Transferred to another type of service   ______ Left against medical advice (AMA) / Eloped               Clinicians: CANDIE Flores & CANDIE Glass     Date: 5/31/2024           Time: 08:00

## 2024-05-30 NOTE — GROUP NOTE
Group Therapy Documentation    PATIENT'S NAME: Alejandrina Clement  MRN:   [id number]  :   1965  ACCT. NUMBER: [id number]  DATE OF SERVICE: 24  START TIME: 12:30 PM  END TIME:  2:30 PM  FACILITATOR(S): Opal Becerril LADC  TOPIC: BEH Group Therapy  Number of patients attending the group:  5  Group Length:  2 Hours    Group Therapy Type: Recovery strategies, Emotion processing, and Daily living/independence skills    Summary of Group / Topics Discussed:    Recovery Principles, Relationship/socialization, Balanced lifestyle, Mindfulness/Relaxation, and Emotions/expression  Check-ins with their feelings; daily reflection reading and discussion; graduation presentation; music therapy.      Group Attendance:  Attended group session    Patient's response to the group topic/interactions:  cooperative with task    Patient appeared to be Engaged.        Client specific details:  Patient celebrated sobriety as she is completing treatment the following day. Patient participated in the group activity and shared appropriate feedbacks.

## 2024-05-31 NOTE — ADDENDUM NOTE
Encounter addended by: Opal Becerril [facility] on: 5/31/2024 1:39 PM   Actions taken: Clinical Note Signed, Episode resolved

## 2024-05-31 NOTE — ADDENDUM NOTE
Encounter addended by: Opal Becerril LADC on: 5/31/2024 10:07 AM   Actions taken: Pend clinical note

## 2024-05-31 NOTE — PROGRESS NOTES
COUNSELOR S DISCHARGE SUMMARY    Date: 2024     Program Name:  [facility]    Client Name Alejandrina Clement Date of Birth 1965      MR#  [id number]    Referred by: self/assessment       Release copies to: MONALISA STRONG      Admit date:5/3/2024  Discharge Date: 2024  # of Days Attended: 28  Date Last Attended: 2024     Discharge Status:  With Staff Approval    PROBLEMS PRESENTED AT ADMISSION:  (Include reasons & circumstances for admission)  Alejandrina Clement is a 59 year old year old female who admitted to [facility] for substance use disorder. Patient reports using alcohol, cocaine, and marijuana long term. Patient reports date of last use for any substance was on 2024. Patient endorses mental health symptoms of anxiety and depression; has no formal mental health diagnosis. Patient was assessed during admission, and denies suicidal or homicidal ideation, intent or plan.       Admitting diagnosis: Alcohol Use Disorder Severe F10.20; Stimulant Use Disorder Severe F14.20; Cannabis Use Disorder Mild F12.20      PROGRAM PARTICIPATION:  While at [facility], Alejandrina Clement received the following services to address substance use and mental health disorders.  she participated in:  Group Therapy, Individual Therapy, Family Therapy, Psychiatric Medication Management, Recreation Activities, Spirituality Groups, DBT Skills Groups, AA/NA meetings , Life Skills Groups, Psych-education Groups, and STD/HIV awareness group      PROGRESS:     Dimension 1 - Acute Intoxication/Withdrawal Potential:  Admission ASAM Risk Ratin Client displays full functioning with good ability to tolerate and cope with withdrawal discomfort. No signs or symptoms of intoxication or withdrawal or resolving signs or symptoms.  Discharge ASAM Risk Ratin Client displays full functioning with good ability to tolerate and cope with withdrawal discomfort. No signs or symptoms of intoxication  or withdrawal or resolving signs or symptoms.  Treatment goal(s):  Develop effective strategies to maintain sobriety.   and Be able to manage mild to moderate withdrawal symptoms   Progress toward goal(s):  Alejandrina reports last use date of 2024.  Patient denied symptoms of post-acute withdrawal while in treatment; patient agreed to report any substance use to staff and any worsening withdrawal symptoms to nurse. Patient met with addiction medicine provider while in treatment. Patient attended an educational lecture on post-acute withdrawal and verbalized understanding of the timeline of symptoms.       Dimension 2 - Biomedical Conditions & Complications:  Admission ASAM Risk Ratin Client tolerates and mariaa with physical discomfort and is able to get the services that the client needs.  Discharge ASAM Risk Ratin Client tolerates and mariaa with physical discomfort and is able to get the services that the client needs.  Treatment goal(s):  Follow recommendations of medical provider.  Improve and maintain overall physical health.  Progress toward goal(s):  Alejandrina denied any biomedical concerns that would interfere with treatment. Patient plans to follow up with their primary provider as needed.       Dimension 3 - Emotional/Behavioral Conditions & Complications:  Admission ASAM Risk Ratin Client has difficulty with impulse control and lacks coping skills. Client has thoughts of suicide or harm to others without means; however, the thoughts may interfere with participation in some treatment activities. Client has difficulty functioning in significant life areas. Client has moderate symptoms of emotional, behavioral, or cognitive problems. Client is able to participate in most treatment activities.  Discharge ASAM Risk Ratin Client has difficulty with impulse control and lacks coping skills. Client has thoughts of suicide or harm to others without means; however, the thoughts may interfere with  "participation in some treatment activities. Client has difficulty functioning in significant life areas. Client has moderate symptoms of emotional, behavioral, or cognitive problems. Client is able to participate in most treatment activities.  Treatment goal(s):  Become medication compliant.Schedule a psychatric evaluation. Improve self-esteem. Understand the relationship between addiction and mental health issues. Learn skills to express emotions in a healthy and appropriate way.  Progress toward goal(s):   Alejandrina reports no formal mental health diagnosis. Patient reported symptoms of anxiety and depression. Patient was compliant with following medication regimen while in treatment. Patient participated in a suicide risk screening on admission and was assessed as \"Low/moderate Risk.\"  Upon admission, their PHQ-9 was assessed as 15 out of 27 and their ESTEPHANIE-7 as 13 out of 21. Patient completed a required safety plan the first week of treatment with their primary counselor. Patient denied suicidal or homicidal ideation while in treatment. Patient worked to develop a more positive self-image by completing her \"creating new beliefs\"  assignment and presenting in group. Patient demonstrated the ability to turn their negative self-talk into positive affirmations while in treatment. Patient met with staff therapist to process mental health concerns. Due to patient's continued need for increased emotional regulation and low distress tolerance, they remain at risk rating  2.        Dimension 4 - Treatment Acceptance/Resistance:  Admission ASAM Risk Ratin Client displays verbal compliance, but lacks consistent behaviors; has low motivation for change; and is passively involved in treatment.  Discharge ASAM Risk Ratin Client is motivated with active reinforcement, to explore treatment and strategies for change, but ambivalent about illness or need for change.  Treatment goal(s):  Understand the impact your substance use " has had on you. Understand the impact your substance use has had on your family and significant relationships., Increase internal motivation., and connect with your spirituality.  Progress toward goal(s):  At time of admission, Alejandrina identified their primary motivation for treatment was for herself. Patient completed their  Consequences of continuing addictive lifestyle  assignment and identified how their alcohol/drug use has contributed to violating their personal value system. Patient's internal motivation was increased by  creating a collage of what their life will look like in five years sober vs. still using. Patient appears to be in the Stages of Change Model Contemplation stage of change at this time. Patient's risk rating in this area remains lowered  to a  1  as she is  cooperative, motivated, and ready to change. Patient remained motivated with active reinforcement from counselors while in treatment. Patient  display verbal compliance but lacks consistent behaviors for long-term sobriety.       Dimension 5 - Relapse/Continued Problem Potential:  Admission ASAM Risk Ratin No awareness of the negative impact of mental health problems or substance abuse. No coping skills to arrest mental health or addiction illnesses, or prevent relapse.  Discharge ASAM Risk Rating: 3 Client has poor recognition and understanding of relapse and recidivism issues and displays moderately high vulnerability for further substance use or mental health problems. Client has few coping skills and rarely applies coping skills.  Treatment goal(s):  Develop sober coping and living skills., Identify personal triggers and relapse warning signs., Identify what led to your last relapse., Identify and understand personal relapse and self-sabotage patterns., and Develop insight on how cross-addiction has impacted your life  Progress toward goal(s):  Alejandrina worked on assignments to identify their relapse patterns, and read material on  emotional regulation. Patient participated in two weekend workshops on relapse prevention and completed a relapse prevention plan. Patient demonstrated the ability to verbalize high risk situations for relapse and identified detailed coping skills for triggering situations. She monitored any urges/cravings throughout treatment. Patient learned about boundaries and co-dependency and challenged their tendency to isolate by engaging with peers while in treatment. Patient continues to struggle with setting boundaries with unsupportive people in their life and would benefit from individual therapy. Patient's risk rating in this dimension lowered to a  3  based on their completion of all treatment plan goals in this area.      Dimension 6 - Recovery Environment: (family, recreation, legal, education, etc.)  Admission ASAM Risk Ratin Client has (A) Chronically antagonistic significant other, living environment, family, peer group or long-term criminal justice involvement that is harmful to recovery or treatment progress, or (B) Client has an actively antagonistic significant other, family, work, or living environment with immediate threat to the client's safety and well-being.  Discharge ASAM Risk Rating: 3 Client is not engaged in structured, meaningful activity and the client's peers, family, significant other, and living environment are unsupportive, or there is significant criminal justice system involvement.  Treatment goal(s): Develop a sober support network., Develop boundaries., and reflect on treatment experience, celebrate accomplishments, anticipate challenges in new sober lifestyle and set goals.  Progress toward goal(s):  Alejandrina identified ways to improve their sober environment by identifying 25 sober activities and attending weekly 12-step support group meetings.  Patient worked to build supportive relationships while in treatment with same-gender peers. Patient attended two relationships workshops and  received education on family roles in addiction, healthy versus unhealthy relationships, and codependency. Patient's risk rating lowered to a  3  based on their aftercare plans and safe housing.        Client strengths identified during treatment were: Alejandrina appeared sincere in their desire to establish a recovery lifestyle, put forth consistent effort in reaching treatment plan goals and following staff recommendations, and expressed genuine concern for other group members. Patient freely offered support as well as encouragement, and gained considerable insight into relapse prevention strategies and resources which can be utilized in recovery. Patient was honest about the struggle/ambivalence about sobriety in the beginning of treatment. Patient consistently attended groups and lectures and displayed willingness to complete treatment plan assignments. Patient was able to respectfully challenge peers about addictive thinking patterns, using themself as an example. Patient added spontaneity to the group using her positive attitude and humor.      Client needs identified during treatment were:  safe and sober milieu, addiction medicine provider, individual therapy session, group therapy; psychoeducational skills group; [facility] health assessment; sober therapeutic activities    Discharge Diagnosis:  Alcohol Use Disorder Severe F10.20  Stimulant Use Disorder Severe F14.20  Cannabis Use Disorder Mild F12.20     Discharge Medications:   Current Outpatient Medications   Medication Sig Dispense Refill    acetaminophen (TYLENOL) 500 MG tablet Take 500-1,000 mg by mouth every 8 hours as needed for mild pain      alum & mag hydroxide-simethicone (MAALOX) 200-200-20 MG/5ML SUSP suspension Take 30 mLs by mouth every 6 hours as needed for indigestion      benzocaine-menthol (CEPACOL) 15-3.6 MG lozenge Place 1 lozenge inside cheek every 2 hours as needed for sore throat      escitalopram (LEXAPRO) 10 MG tablet Take 1 tablet (10 mg)  by mouth daily 30 tablet 0    gabapentin (NEURONTIN) 300 MG capsule Take 1 capsule (300 mg) by mouth 3 times daily 90 capsule 0    guaiFENesin-dextromethorphan (ROBITUSSIN DM) 100-10 MG/5ML syrup Take 10 mLs by mouth 4 times daily as needed for cough      ibuprofen (ADVIL/MOTRIN) 200 MG tablet Take 600 mg by mouth every 6 hours as needed for pain      loratadine (CLARITIN) 10 MG tablet Take 10 mg by mouth daily      melatonin 5 MG tablet Take 5 mg by mouth nightly as needed for sleep      naltrexone (DEPADE/REVIA) 50 MG tablet Take 1 tablet (50 mg) by mouth daily 30 tablet 0    nicotine (NICODERM CQ) 21 MG/24HR 24 hr patch Place 1 patch onto the skin every 24 hours 28 patch 1    QUEtiapine (SEROQUEL) 50 MG tablet Take 1-1.5 tablets (50-75 mg) by mouth at bedtime 21 tablet 0    senna-docusate (SENOKOT-S/PERICOLACE) 8.6-50 MG tablet Take 2 tablets by mouth daily as needed for constipation       No current facility-administered medications for this encounter.     Facility-Administered Medications Ordered in Other Encounters   Medication Dose Route Frequency Provider Last Rate Last Admin    Self Administer Medications: Behavioral Services   Does not apply See Admin Instructions Brenda Negrete MD           Discharge Plan and Recommendations:    PROGNOSIS:     Patient has a guarded prognosis, this may be upgraded to favorable assuming that they follow all the aftercare recommendations and continues to build sober support network.        LIVING ARRANGEMENTS AT DISCHARGE:  Sober living at [facility]      CONTINUING CARE RECOMMENDATIONS/REFERRALS:   Remain abstinent from all mood altering chemicals except those prescribed and non-addictive.  Enter aftercare at [facility]  Comply with expectations of extended care.   Monitor and comply with the advice of your doctor regarding mental and physical health, remain medication compliant.  Attend a minimum of two 12-step meetings weekly and obtain a same-gender  sponsor.  Continue investment in building sober support network in recovery.  Seek individual therapy with referrals given.  Continue to practice coping skills for emotional health and substance use relapse prevention.  Continue to pursue employment or volunteer opportunities.   Complete all legal requirements to resolve issues. (If needed)    This information has been disclosed to you from records protected by Federal confidentiality rules (42 CFR part 2). The Federal rules prohibit you from making any further disclosure of this information unless further disclosure is expressly permitted by the written consent of the person to whom it pertains or as otherwise permitted by 42 CFR part 2. A general authorization for the release of medical or other information is NOT sufficient for this purpose. The Federal rules restrict any use of the information to criminally investigate or prosecute any alcohol or drug abuse patient.

## 2024-06-16 ENCOUNTER — TELEPHONE (OUTPATIENT)
Dept: BEHAVIORAL HEALTH | Facility: CLINIC | Age: 59
End: 2024-06-16
Payer: COMMERCIAL

## 2024-06-17 ENCOUNTER — TELEPHONE (OUTPATIENT)
Dept: PSYCHIATRY | Facility: CLINIC | Age: 59
End: 2024-06-17
Payer: COMMERCIAL

## 2024-06-17 DIAGNOSIS — F41.9 ANXIETY: ICD-10-CM

## 2024-06-17 DIAGNOSIS — F32.9 MAJOR DEPRESSIVE DISORDER WITH CURRENT ACTIVE EPISODE, UNSPECIFIED DEPRESSION EPISODE SEVERITY, UNSPECIFIED WHETHER RECURRENT: ICD-10-CM

## 2024-06-17 DIAGNOSIS — F51.01 PRIMARY INSOMNIA: ICD-10-CM

## 2024-06-17 NOTE — TELEPHONE ENCOUNTER
Health Call Center    Phone Message    May a detailed message be left on voicemail: yes     Reason for Call: Medication Refill Request    Has the patient contacted the pharmacy for the refill? Yes   Name of medication being requested: lexapro, gabapentin, seroquel - she is not sure what other mental health prescriptions she has, but she wants them all refilled  Provider who prescribed the medication: Dr. Jona Odom MD  Pharmacy: [facility] ([facility] in [place] ([phone])  Date medication is needed: pt is completely out of medication. PT stated she has been at [facility] since the weekend.        Action Taken: Other: [facility] [employer] nursing pool    Travel Screening: Not Applicable     Date of Service:

## 2024-06-17 NOTE — TELEPHONE ENCOUNTER
Last seen: 05/15/2024  RTC: 2-3 weeks  Cancel: 0  No-show: 1  Next appt: None     Incoming refill from Patient via phone    Medication requested:   Pending Prescriptions:                       Disp   Refills    gabapentin (NEURONTIN) 300 MG capsule     90 cap*0            Sig: Take 1 capsule (300 mg) by mouth 3 times daily    escitalopram (LEXAPRO) 10 MG tablet       30 tab*0            Sig: Take 1 tablet (10 mg) by mouth daily        Last refill per   05/17/2024    From chart note:   - Continue gabapentin 600mg TID for anxiety and craving control   - Continue Lexapro 10mg daily, reevaluate for possible dose increase at next follow-up in 2 to 3 weeks    - Discontinue Seroquel, Patient has not noticed benefit from Seroquel which was recently increased to 50 mg nightly.        Medication unable to be refilled by RN due to criteria not met as indicated.                 []Eligibility - not seen in the last year              [x]Supervision - no future appointment              [x]Compliance - no shows, cancellations or lapse in therapy              []Verification - order discrepancy              []Controlled medication              []Medication not included in policy              []90-day supply request              [x]Other:

## 2024-06-17 NOTE — TELEPHONE ENCOUNTER
[facility] Call Center    Phone Message    May a detailed message be left on voicemail: yes     Reason for Call: Medication Refill Request    Has the patient contacted the pharmacy for the refill? Yes   Name of medication being requested: Lexapro 10 mg , gabapentin 300 mg, naltrexone 50 mg & seroquel 50- 75 mg  Provider who prescribed the medication: Dr. Odom  Pharmacy: [address]   Date medication is needed: ASAP pt is out of the medication and would like to receive the medication by today , also pt stated that she is in a treatment center     Action Taken: Other: nurse pool    Travel Screening: Not Applicable     Date of Service:

## 2024-06-18 RX ORDER — QUETIAPINE FUMARATE 50 MG/1
50-75 TABLET, FILM COATED ORAL AT BEDTIME
Qty: 45 TABLET | Refills: 0 | Status: SHIPPED | OUTPATIENT
Start: 2024-06-18 | End: 2024-07-26

## 2024-06-18 RX ORDER — ESCITALOPRAM OXALATE 10 MG/1
10 TABLET ORAL DAILY
Qty: 30 TABLET | Refills: 0 | Status: SHIPPED | OUTPATIENT
Start: 2024-06-18

## 2024-06-18 RX ORDER — GABAPENTIN 300 MG/1
300 CAPSULE ORAL 3 TIMES DAILY
Qty: 90 CAPSULE | Refills: 0 | Status: SHIPPED | OUTPATIENT
Start: 2024-06-18

## 2024-06-18 NOTE — TELEPHONE ENCOUNTER
Briefly reviewed chart, refilled requested medications. Please see phone encounter documentation from May re: Seroquel, refill sent today. Patient should call to schedule follow-up appointment within the next month to address further refills.    Jona Odom MD

## 2024-07-26 DIAGNOSIS — F51.01 PRIMARY INSOMNIA: ICD-10-CM

## 2024-07-26 NOTE — TELEPHONE ENCOUNTER
Last seen: 05/15/2024  RTC: 2-3 weeks  Cancel: 0  No-show: 1  Next appt: None     Incoming refill from Patient via phone    Medication requested:   Pending Prescriptions:                       Disp   Refills    QUEtiapine (SEROQUEL) 50 MG tablet        45 tab*0            Sig: Take 1-1.5 tablets (50-75 mg) by mouth at bedtime      From chart note:   Please see phone encounter documentation from May re: Seroquel, refill sent today.       Medication unable to be refilled by RN due to criteria not met as indicated.                 []Eligibility - not seen in the last year              [x]Supervision - no future appointment              [x]Compliance - no shows, cancellations or lapse in therapy              []Verification - order discrepancy              []Controlled medication              []Medication not included in policy              []90-day supply request              [x]Other:

## 2024-07-26 NOTE — TELEPHONE ENCOUNTER
PT is requesting refill/dose increase on quetiapine. Last fill at our pharmacy was 05/08/2024, not sure if refill appropriate, or if it needs sent elsewhere. PT requested 150mg dose, but this does not match any RX we currently have, or records on EPIC. May want to call PT to clarify dose/instructions and which pharmacy they use.    Thank you,  Nisreen Bhatia, [facility] Float [facility]

## 2024-08-06 RX ORDER — QUETIAPINE FUMARATE 50 MG/1
50-75 TABLET, FILM COATED ORAL AT BEDTIME
Qty: 45 TABLET | Refills: 0 | Status: SHIPPED | OUTPATIENT
Start: 2024-08-06

## 2025-04-01 ENCOUNTER — HOSPITAL ENCOUNTER (OUTPATIENT)
Facility: CLINIC | Age: 60
End: 2025-04-01
Attending: ORTHOPAEDIC SURGERY | Admitting: ORTHOPAEDIC SURGERY
Payer: COMMERCIAL

## 2025-04-24 ENCOUNTER — TELEPHONE (OUTPATIENT)
Dept: BEHAVIORAL HEALTH | Facility: CLINIC | Age: 60
End: 2025-04-24
Payer: COMMERCIAL

## 2025-04-24 NOTE — TELEPHONE ENCOUNTER
Writer unable to leave VM as phone number not in service for reminder call.    Eli Dalton  04/24/2025  157

## 2025-04-25 ENCOUNTER — OFFICE VISIT (OUTPATIENT)
Dept: BEHAVIORAL HEALTH | Facility: CLINIC | Age: 60
End: 2025-04-25
Payer: COMMERCIAL

## 2025-04-25 VITALS
HEART RATE: 78 BPM | BODY MASS INDEX: 30.37 KG/M2 | SYSTOLIC BLOOD PRESSURE: 127 MMHG | WEIGHT: 189 LBS | OXYGEN SATURATION: 98 % | DIASTOLIC BLOOD PRESSURE: 87 MMHG | HEIGHT: 66 IN

## 2025-04-25 DIAGNOSIS — F33.9 MAJOR DEPRESSIVE DISORDER, RECURRENT EPISODE WITH ANXIOUS DISTRESS: Primary | ICD-10-CM

## 2025-04-25 DIAGNOSIS — F17.200 TOBACCO USE DISORDER: ICD-10-CM

## 2025-04-25 DIAGNOSIS — F10.91 ALCOHOL USE DISORDER IN REMISSION: ICD-10-CM

## 2025-04-25 DIAGNOSIS — F41.1 GAD (GENERALIZED ANXIETY DISORDER): ICD-10-CM

## 2025-04-25 DIAGNOSIS — F15.90 STIMULANT USE DISORDER: ICD-10-CM

## 2025-04-25 RX ORDER — NICOTINE 21 MG/24HR
1 PATCH, TRANSDERMAL 24 HOURS TRANSDERMAL EVERY 24 HOURS
Qty: 28 PATCH | Refills: 1 | Status: SHIPPED | OUTPATIENT
Start: 2025-04-25

## 2025-04-25 RX ORDER — ESCITALOPRAM OXALATE 5 MG/1
5 TABLET ORAL DAILY
Qty: 30 TABLET | Refills: 2 | Status: SHIPPED | OUTPATIENT
Start: 2025-04-25

## 2025-04-25 RX ORDER — ESCITALOPRAM OXALATE 10 MG/1
10 TABLET ORAL DAILY
Qty: 30 TABLET | Refills: 2 | Status: SHIPPED | OUTPATIENT
Start: 2025-04-25

## 2025-04-25 RX ORDER — QUETIAPINE 300 MG/1
300 TABLET, FILM COATED, EXTENDED RELEASE ORAL AT BEDTIME
Qty: 30 TABLET | Refills: 2 | Status: SHIPPED | OUTPATIENT
Start: 2025-04-25

## 2025-04-25 RX ORDER — QUETIAPINE FUMARATE 25 MG/1
25 TABLET, FILM COATED ORAL EVERY MORNING
Qty: 30 TABLET | Refills: 2 | Status: SHIPPED | OUTPATIENT
Start: 2025-04-25

## 2025-04-25 ASSESSMENT — ANXIETY QUESTIONNAIRES
GAD7 TOTAL SCORE: 12
6. BECOMING EASILY ANNOYED OR IRRITABLE: MORE THAN HALF THE DAYS
7. FEELING AFRAID AS IF SOMETHING AWFUL MIGHT HAPPEN: NOT AT ALL
7. FEELING AFRAID AS IF SOMETHING AWFUL MIGHT HAPPEN: NOT AT ALL
3. WORRYING TOO MUCH ABOUT DIFFERENT THINGS: MORE THAN HALF THE DAYS
5. BEING SO RESTLESS THAT IT IS HARD TO SIT STILL: MORE THAN HALF THE DAYS
IF YOU CHECKED OFF ANY PROBLEMS ON THIS QUESTIONNAIRE, HOW DIFFICULT HAVE THESE PROBLEMS MADE IT FOR YOU TO DO YOUR WORK, TAKE CARE OF THINGS AT HOME, OR GET ALONG WITH OTHER PEOPLE: EXTREMELY DIFFICULT
GAD7 TOTAL SCORE: 12
2. NOT BEING ABLE TO STOP OR CONTROL WORRYING: MORE THAN HALF THE DAYS
4. TROUBLE RELAXING: MORE THAN HALF THE DAYS
GAD7 TOTAL SCORE: 12
8. IF YOU CHECKED OFF ANY PROBLEMS, HOW DIFFICULT HAVE THESE MADE IT FOR YOU TO DO YOUR WORK, TAKE CARE OF THINGS AT HOME, OR GET ALONG WITH OTHER PEOPLE?: EXTREMELY DIFFICULT
1. FEELING NERVOUS, ANXIOUS, OR ON EDGE: MORE THAN HALF THE DAYS

## 2025-04-25 ASSESSMENT — PAIN SCALES - GENERAL: PAINLEVEL_OUTOF10: NO PAIN (0)

## 2025-04-25 ASSESSMENT — PATIENT HEALTH QUESTIONNAIRE - PHQ9
10. IF YOU CHECKED OFF ANY PROBLEMS, HOW DIFFICULT HAVE THESE PROBLEMS MADE IT FOR YOU TO DO YOUR WORK, TAKE CARE OF THINGS AT HOME, OR GET ALONG WITH OTHER PEOPLE: EXTREMELY DIFFICULT
SUM OF ALL RESPONSES TO PHQ QUESTIONS 1-9: 13
SUM OF ALL RESPONSES TO PHQ QUESTIONS 1-9: 13
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
## 2025-04-25 NOTE — PROGRESS NOTES
"  Mental Health and Collaborative Care Psychiatry Service Rooming Note      Most pressing mental health concern at this time: Anxiety is \"néstor high\" , medications are not working  Gabapentin works for nerve pain but doesn't help with anxiety  Wants to discuss her Seroquel dose for sleep  Resides in sober living    Any new physical health conditions or diagnoses affecting you that we should be aware of: \" brain tumor \" + nerves on her hand are damaged per patient      Side effects related to medications patient would like to discuss with the provider:  No      Are you taking your medications as prescribed?  yes        Do you need refills of any of the medications?  Nicotine patches      Are you taking any recreational substances? Has been clean for 1 year. Smoking 0.5 PPD      Is there any chance you are pregnant? postmenopausal      Add attendance guidelines .phrase here   Care team has reviewed attendance agreement with patient. Patient advised that two failed appointments within 6 months may lead to termination of current episode of care.       **If appointment is with Transition Clinic-include the following:      \"The Transition Clinic is a temporary psychiatry service that helps to bridge the time to your next appointment. It is not intended to be a long-term service and you are expected to attend your scheduled appointment with your next provider.\"    [ X ] Patient/Parent verbalized understanding     If you need support between appointments, please call [phone] and let them know you're seen by [facility] Psychiatry. You may also send a [employer] message to reach us.     New (awaiting) Mental health provider or next programming: [facility]  Date of scheduled apt: JACIEL Garland LPN  April 25, 2025  8:50 AM        "

## 2025-04-25 NOTE — PATIENT INSTRUCTIONS
Change:  Escitalopram/Lexapro 15 mg daily (10 mg + 5 mg); increased from 10 mg    Quetiapine/Seroquel: 25 mg in the AM + 300 mg XR at bedtime; TDD = 325 mg/day;  increased from 300 mg    Continue:  Nicotine/Nicoderm 21 mg/24 patch    Per other clinicians  -Gabapentin  -Naltrexone    -------------------    -If there are questions/inquiries between now and the upcoming follow up appointment OR prior to transferring to the next level of care, please call or message the [facility] Psychiatry Department.    Clinic hours/phone number:   -Monday to Friday from 8:00 am to 4:30 pm  -[phone]      MyChart:  -IS NOT an emergency messaging service  -Should not be considered equivalent to text messages   -Please allow up to 3 business days for a reply   -If you do not receive a response within 3 business days, please do not hesitate to contact us again via calling (see above) or messaging to check on the status of your questions or concerns    -----------------------    Call:  -[phone] ([phone]) if guidance is needed after [facility] clinic hours about utilizing [facility] versus the Emergency Departments    ----------------------    Any time (during or after regular clinic hours) immediate and or life threatening symptoms occur (either medical or mental health), call:  [phone] and or go to the nearest Emergency Department      ---------------------    Please use the following websites to obtain a comprehensive list of all counties within the [facility] for adult and child mental health crisis numbers:    https://[place].gov/dhs/people-we-serve/people-with-disabilities/health-care/adult-mental-health/resources/crisis-contacts.jsp    https://[place]/dhs/people-we-serve/people-with-disabilities/health-care/childrens-mental-health/resources/crisis-contacts.jsp      -------------------      Below is a list of county (also found on the above websites), state, and national crisis numbers.   These resources can provide  real-time assistance if experiencing moderate to severe mental health symptoms.        Additionally, please visit your [facility] website to find the most up-to-date list of local:  adult, child, family, and chemical dependency services available.        [facility]  [phone]    [facility]  [phone]    [facility]  [phone]    [facility]  [phone]    [facility]  [phone]: Adults 18 and over    [phone]: Children 17 and under    [facility] ([facility] Acute Psychiatric Services (APS) Assessment & Referral:   [phone]    Community Outreach for Psychiatric Emergencies (COPE):  [phone]    [facility]  [phone]: Adults 18 and over    [phone]: Children 17 and under      Walk-in crisis services are available Monday to Friday from 8 am to 5:30 pm at Urgent Care for  Adult metal health:    [address] [place] [address]  Closed on Saturday, Sunday, and holidays    [facility]  [phone]: Toll free    [facility]  [phone]      [facility] [place]  [phone]: Toll free    Text: [place] to 433345      [facility] and human services  Call 211 or visit https://www.211unitedway.org to obtain free and confidential h/hs information     [place] [facility]  If you are an adult needing support, you can talk to a specialist who has first hand experience living with a mental health condition:    [phone]    Text: Support to 11830    [facility] [employer] [place]:  domestic violence/LGBTQ+  [phone] option 3    [employer]: LGBTQ+  1-613.284.6134    Text: START to 346874    [employer] [facility]  8-532-EgtiOwv: (1-630.683.2293)    Crisis line: 1-946.623.2056    Text: 863694    [employer] [facility]: women and sexually exploited youth  [phone]    [employer] Domestic Violence Shelter Hotline  740.392.3126    National Suicide Prevention Lifeline  515    National Youth Crisis  Hotline  194

## 2025-05-30 ENCOUNTER — ANESTHESIA EVENT (OUTPATIENT)
Dept: SURGERY | Facility: CLINIC | Age: 60
End: 2025-05-30
Payer: COMMERCIAL

## 2025-05-30 NOTE — PLAN OF CARE
Writer attempted to call patient's listed phone number in order to complete pre op phone call but the number is not valid. Writer also attempted to call patient's significant other but they did not answer and their voicemail box is full. Lastly, writer attempted to call patient's cousin but the number is not valid.     Larisa Fitzgerald RN on 5/30/2025 at 1:38 PM

## 2025-05-30 NOTE — ANESTHESIA PREPROCEDURE EVALUATION
Anesthesia Pre-Procedure Evaluation    Patient: Alejandrina Clement   MRN: [id number] : 1965          Procedure : Procedure(s):  Endoscopic carpal tunnel release  Cubital tunnel release         Past Medical History:   Diagnosis Date    Alteration in appetite     Benign tumor of brain (H)     Chronic cough     Constipation     Depression     Dizziness     Hypertension     Numbness and tingling     Weight loss       No past surgical history on file.   Allergies   Allergen Reactions    Other Environmental Allergy      bee    Vicodin [Hydrocodone-Acetaminophen]       Social History     Tobacco Use    Smoking status: Every Day     Current packs/day: 0.50     Types: Cigarettes    Smokeless tobacco: Never   Substance Use Topics    Alcohol use: Not Currently     Alcohol/week: 5.0 standard drinks of alcohol     Types: 5 Shots of liquor per week     Comment: last use 2024      Wt Readings from Last 1 Encounters:   24 58.5 kg (129 lb)        Anesthesia Evaluation            ROS/MED HX  ENT/Pulmonary:  - neg pulmonary ROS     Neurologic:  - neg neurologic ROS     Cardiovascular:  - neg cardiovascular ROS   (+)  hypertension- -  CAD -  - -                                      METS/Exercise Tolerance:     Hematologic:  - neg hematologic  ROS     Musculoskeletal:  - neg musculoskeletal ROS     GI/Hepatic:  - neg GI/hepatic ROS     Renal/Genitourinary:  - neg Renal ROS     Endo:     (+)               Obesity,       Psychiatric/Substance Use:     (+) psychiatric history depression (adjustment disorder) alcohol abuse  Recreational drug usage: Cocaine and Other (Comment) (no drug/alcohol use since 25).    Infectious Disease:  - neg infectious disease ROS     Malignancy:  - neg malignancy ROS     Other:              Physical Exam    OUTSIDE LABS:  CBC:   Lab Results   Component Value Date    WBC 6.5 2024    WBC 8.7 2015    HGB 12.6 2024    HGB 13.8 2015    HCT 38.2 2024    HCT 41.4  "09/23/2015     05/02/2024     09/23/2015     BMP:   Lab Results   Component Value Date     05/02/2024     (L) 08/18/2020    POTASSIUM 4.4 05/02/2024    POTASSIUM 3.6 08/18/2020    CHLORIDE 102 05/02/2024    CHLORIDE 100 08/18/2020    CO2 22 05/02/2024    CO2 30 08/18/2020    BUN 13.3 05/02/2024    BUN 14 08/18/2020    CR 0.94 05/02/2024    CR 0.88 08/18/2020    GLC 75 05/02/2024     (H) 08/18/2020     COAGS: No results found for: \"PTT\", \"INR\", \"FIBR\"  POC:   Lab Results   Component Value Date    HCG Negative 09/23/2015     HEPATIC:   Lab Results   Component Value Date    ALBUMIN 4.3 05/02/2024    PROTTOTAL 7.8 05/02/2024    ALT 38 05/02/2024    AST 56 (H) 05/02/2024    ALKPHOS 68 05/02/2024    BILITOTAL 0.6 05/02/2024     OTHER:   Lab Results   Component Value Date    DEVAN 9.2 05/02/2024    MAG 2.1 05/02/2024    LIPASE 447 (H) 09/23/2015    TSH 1.61 05/02/2024       Anesthesia Plan        RICARDO Archuleta CRNA    I have reviewed the pertinent notes and labs in the chart from the past 30 days and (re)examined the patient.  Any updates or changes from those notes are reflected in this note.    Clinically Significant Risk Factors Present on Admission                                 # Financial/Environmental Concerns:                 "

## 2025-06-01 RX ORDER — CEFAZOLIN SODIUM/WATER 2 G/20 ML
2 SYRINGE (ML) INTRAVENOUS
Status: CANCELLED | OUTPATIENT
Start: 2025-06-01

## 2025-06-01 RX ORDER — HYDROCORTISONE SODIUM SUCCINATE 100 MG/2ML
100 INJECTION INTRAMUSCULAR; INTRAVENOUS
Status: CANCELLED | OUTPATIENT
Start: 2025-06-02

## 2025-06-01 RX ORDER — CEFAZOLIN SODIUM/WATER 2 G/20 ML
2 SYRINGE (ML) INTRAVENOUS SEE ADMIN INSTRUCTIONS
Status: CANCELLED | OUTPATIENT
Start: 2025-06-01

## 2025-06-02 ENCOUNTER — ANESTHESIA (OUTPATIENT)
Dept: SURGERY | Facility: CLINIC | Age: 60
End: 2025-06-02
Payer: COMMERCIAL

## 2025-06-12 ENCOUNTER — TELEPHONE (OUTPATIENT)
Dept: BEHAVIORAL HEALTH | Facility: CLINIC | Age: 60
End: 2025-06-12
Payer: COMMERCIAL

## 2025-06-12 DIAGNOSIS — F33.9 MAJOR DEPRESSIVE DISORDER, RECURRENT EPISODE WITH ANXIOUS DISTRESS: Primary | ICD-10-CM

## 2025-06-12 DIAGNOSIS — F41.1 GAD (GENERALIZED ANXIETY DISORDER): ICD-10-CM

## 2025-06-12 RX ORDER — QUETIAPINE FUMARATE 50 MG/1
50 TABLET, FILM COATED ORAL EVERY MORNING
Qty: 30 TABLET | Refills: 0 | Status: SHIPPED | OUTPATIENT
Start: 2025-06-12

## 2025-06-12 NOTE — TELEPHONE ENCOUNTER
[facility] nursing please do the following on June 12, 2025:     1.    -Re-attempt to call, update on the following:    -Seroquel 25 mg in the AM can be increased to 50 mg in the AM; new script sent to [facility] #[id number]    -Remainder of medication regimen to the stay the same until upcoming follow up on 6/27/2025

## 2025-06-12 NOTE — TELEPHONE ENCOUNTER
1. RN made 2 different attempts to reach patients. Patient does not have active MyChart.    2. RN routing update to  Lroene SILVA CNP for review and awareness of patient report.

## 2025-06-12 NOTE — TELEPHONE ENCOUNTER
1. RN reattempted call to patient to update of the following:    -Re-attempt to call, update on the following:     -Seroquel 25 mg in the AM can be increased to 50 mg in the AM; new script sent to [facility] #[id number]     -Remainder of medication regimen to the stay the same until upcoming follow up on 6/27/2025    2. RN reached voicemail and left Voice Mail requesting patient call back clinic a [phone] regarding new scripts sent to the pharmacy.

## 2025-06-12 NOTE — TELEPHONE ENCOUNTER
Pt called TC and LVM 6/11/2025 after staff had left for day.     Coordinator returned call. Patient reports that Anxiety pill 25 mg in AM is not working, wondering if can up dose. Will route to TC RN for follow up.     Soonest available in person return visit also scheduled for patient 6/27/2025.     Sheyla Saldivar  [facility] Coordinator  06/12/25 7:45 AM

## 2025-06-17 ENCOUNTER — TELEPHONE (OUTPATIENT)
Dept: BEHAVIORAL HEALTH | Facility: CLINIC | Age: 60
End: 2025-06-17
Payer: COMMERCIAL

## 2025-06-17 NOTE — TELEPHONE ENCOUNTER
Pt called [facility] to review upcoming appt. Provided address for clinic.     Sheyla Saldivar  [facility] Coordinator  06/17/25 9:11 AM

## 2025-06-24 DIAGNOSIS — F41.1 GAD (GENERALIZED ANXIETY DISORDER): ICD-10-CM

## 2025-06-24 DIAGNOSIS — F33.9 MAJOR DEPRESSIVE DISORDER, RECURRENT EPISODE WITH ANXIOUS DISTRESS: ICD-10-CM

## 2025-06-24 RX ORDER — QUETIAPINE FUMARATE 50 MG/1
50 TABLET, FILM COATED ORAL EVERY MORNING
Qty: 30 TABLET | Refills: 0 | OUTPATIENT
Start: 2025-06-24

## 2025-06-24 NOTE — TELEPHONE ENCOUNTER
- RN received refill reqeust for :    Refused Prescriptions:                       Disp   Refills    QUEtiapine (SEROQUEL) 50 MG tablet        30 tab*0            Sig: Take 1 tablet (50 mg) by mouth every morning. In           addition to 300 mg XR at bedtime for ttl: 350           mg/day      Reason for Refusal: Patient has requested refill too soon      Reason for Refusal Comment: Last Fill should last until next appointment.    - Last Fill 6/12/25; Next appointment 6/27/25; RN denied refill request as: Medication is too soon to fill.

## 2025-06-26 ENCOUNTER — TELEPHONE (OUTPATIENT)
Dept: BEHAVIORAL HEALTH | Facility: CLINIC | Age: 60
End: 2025-06-26
Payer: COMMERCIAL

## 2025-08-11 ENCOUNTER — TELEPHONE (OUTPATIENT)
Dept: BEHAVIORAL HEALTH | Facility: CLINIC | Age: 60
End: 2025-08-11
Payer: COMMERCIAL

## 2025-08-12 ENCOUNTER — OFFICE VISIT (OUTPATIENT)
Dept: BEHAVIORAL HEALTH | Facility: CLINIC | Age: 60
End: 2025-08-12
Payer: COMMERCIAL

## 2025-08-12 VITALS
OXYGEN SATURATION: 97 % | HEIGHT: 65 IN | WEIGHT: 194.5 LBS | DIASTOLIC BLOOD PRESSURE: 87 MMHG | SYSTOLIC BLOOD PRESSURE: 139 MMHG | BODY MASS INDEX: 32.4 KG/M2 | TEMPERATURE: 98.1 F | HEART RATE: 73 BPM

## 2025-08-12 DIAGNOSIS — F15.90 STIMULANT USE DISORDER: ICD-10-CM

## 2025-08-12 DIAGNOSIS — F41.1 GAD (GENERALIZED ANXIETY DISORDER): ICD-10-CM

## 2025-08-12 DIAGNOSIS — F33.9 MAJOR DEPRESSIVE DISORDER, RECURRENT EPISODE WITH ANXIOUS DISTRESS: Primary | ICD-10-CM

## 2025-08-12 DIAGNOSIS — F10.91 ALCOHOL USE DISORDER IN REMISSION: ICD-10-CM

## 2025-08-12 DIAGNOSIS — F17.200 TOBACCO USE DISORDER: ICD-10-CM

## 2025-08-12 PROCEDURE — 99215 OFFICE O/P EST HI 40 MIN: CPT | Performed by: NURSE PRACTITIONER

## 2025-08-12 PROCEDURE — 3079F DIAST BP 80-89 MM HG: CPT | Performed by: NURSE PRACTITIONER

## 2025-08-12 PROCEDURE — 3075F SYST BP GE 130 - 139MM HG: CPT | Performed by: NURSE PRACTITIONER

## 2025-08-12 PROCEDURE — 1126F AMNT PAIN NOTED NONE PRSNT: CPT | Performed by: NURSE PRACTITIONER

## 2025-08-12 RX ORDER — BISMUTH SUBSALICYLATE 262 MG
524 TABLET,CHEWABLE ORAL
COMMUNITY
Start: 2024-11-29

## 2025-08-12 RX ORDER — EPINEPHRINE 0.3 MG/.3ML
INJECTION SUBCUTANEOUS
COMMUNITY
Start: 2024-10-08

## 2025-08-12 RX ORDER — FAMOTIDINE 20 MG/1
20 TABLET, FILM COATED ORAL
COMMUNITY
Start: 2025-08-07 | End: 2025-08-21

## 2025-08-12 RX ORDER — LEVOTHYROXINE SODIUM 75 UG/1
TABLET ORAL
COMMUNITY
Start: 2025-01-31

## 2025-08-12 RX ORDER — NYSTATIN 100000 [USP'U]/ML
500000 SUSPENSION ORAL
COMMUNITY
Start: 2025-08-07 | End: 2025-08-14

## 2025-08-12 RX ORDER — METRONIDAZOLE 500 MG/1
TABLET ORAL
COMMUNITY
Start: 2024-11-29

## 2025-08-12 RX ORDER — BUPROPION HYDROCHLORIDE 300 MG/1
300 TABLET ORAL EVERY MORNING
Qty: 30 TABLET | Refills: 1 | Status: SHIPPED | OUTPATIENT
Start: 2025-08-12

## 2025-08-12 RX ORDER — LAMOTRIGINE 100 MG/1
TABLET ORAL
COMMUNITY
Start: 2024-11-22 | End: 2025-08-12

## 2025-08-12 RX ORDER — ONDANSETRON 4 MG/1
4 TABLET, ORALLY DISINTEGRATING ORAL
COMMUNITY
Start: 2024-11-29

## 2025-08-12 RX ORDER — HYDROCORTISONE SODIUM SUCCINATE 100 MG/2ML
INJECTION, POWDER, FOR SOLUTION INTRAMUSCULAR; INTRAVENOUS
COMMUNITY
Start: 2025-02-07

## 2025-08-12 RX ORDER — IBUPROFEN 600 MG/1
TABLET, FILM COATED ORAL
COMMUNITY
Start: 2025-06-28

## 2025-08-12 RX ORDER — BUPROPION HYDROCHLORIDE 150 MG/1
150 TABLET ORAL EVERY MORNING
Qty: 7 TABLET | Refills: 0 | Status: SHIPPED | OUTPATIENT
Start: 2025-08-12 | End: 2025-08-19

## 2025-08-12 RX ORDER — KETOROLAC TROMETHAMINE 10 MG/1
TABLET, FILM COATED ORAL
COMMUNITY
Start: 2025-08-04

## 2025-08-12 RX ORDER — BUSPIRONE HYDROCHLORIDE 10 MG/1
TABLET ORAL
COMMUNITY
Start: 2024-10-29

## 2025-08-12 RX ORDER — METFORMIN HYDROCHLORIDE 500 MG/1
TABLET, EXTENDED RELEASE ORAL
COMMUNITY
Start: 2025-08-11

## 2025-08-12 RX ORDER — MIRTAZAPINE 15 MG/1
TABLET, FILM COATED ORAL
COMMUNITY
Start: 2024-11-23

## 2025-08-12 RX ORDER — HYDROCORTISONE 10 MG/1
TABLET ORAL
COMMUNITY
Start: 2025-08-01

## 2025-08-12 RX ORDER — SERTRALINE HYDROCHLORIDE 100 MG/1
100 TABLET, FILM COATED ORAL DAILY
Qty: 30 TABLET | Refills: 1 | Status: SHIPPED | OUTPATIENT
Start: 2025-08-12

## 2025-08-12 RX ORDER — HYDROXYZINE PAMOATE 50 MG/1
50 CAPSULE ORAL AT BEDTIME
COMMUNITY
End: 2025-08-12

## 2025-08-12 RX ORDER — ACETAMINOPHEN 325 MG/1
TABLET ORAL
COMMUNITY
Start: 2024-09-09

## 2025-08-12 ASSESSMENT — PATIENT HEALTH QUESTIONNAIRE - PHQ9
SUM OF ALL RESPONSES TO PHQ QUESTIONS 1-9: 16
10. IF YOU CHECKED OFF ANY PROBLEMS, HOW DIFFICULT HAVE THESE PROBLEMS MADE IT FOR YOU TO DO YOUR WORK, TAKE CARE OF THINGS AT HOME, OR GET ALONG WITH OTHER PEOPLE: SOMEWHAT DIFFICULT
SUM OF ALL RESPONSES TO PHQ QUESTIONS 1-9: 16

## 2025-08-12 ASSESSMENT — PAIN SCALES - GENERAL: PAINLEVEL_OUTOF10: NO PAIN (0)

## 2025-08-13 ENCOUNTER — PATIENT OUTREACH (OUTPATIENT)
Dept: CARE COORDINATION | Facility: CLINIC | Age: 60
End: 2025-08-13
Payer: COMMERCIAL